# Patient Record
Sex: MALE | Race: WHITE | Employment: UNEMPLOYED | URBAN - METROPOLITAN AREA
[De-identification: names, ages, dates, MRNs, and addresses within clinical notes are randomized per-mention and may not be internally consistent; named-entity substitution may affect disease eponyms.]

---

## 2020-11-21 ENCOUNTER — TELEPHONE (OUTPATIENT)
Dept: GERIATRICS | Facility: CLINIC | Age: 60
End: 2020-11-21

## 2020-11-21 DIAGNOSIS — R52 GENERALIZED PAIN: Primary | ICD-10-CM

## 2020-11-21 RX ORDER — OXYCODONE HYDROCHLORIDE 5 MG/1
5 TABLET ORAL EVERY 4 HOURS PRN
Qty: 6 TABLET | Refills: 0
Start: 2020-11-21 | End: 2020-11-24

## 2020-11-21 NOTE — TELEPHONE ENCOUNTER
Resident is new and has oxycodone 5mg every 6 hours prn and states while in hospital was on 10mg every 3 hours prn.    Asking for a increase in frequency and will see how that does for him.  No script sent, just update his orders in Epic.    Electronically signed by Stephanie Pino RN, CNP

## 2020-11-23 ENCOUNTER — TELEPHONE (OUTPATIENT)
Dept: GERIATRICS | Facility: CLINIC | Age: 60
End: 2020-11-23

## 2020-11-23 VITALS
TEMPERATURE: 97.3 F | RESPIRATION RATE: 22 BRPM | WEIGHT: 220 LBS | SYSTOLIC BLOOD PRESSURE: 131 MMHG | HEIGHT: 69 IN | HEART RATE: 79 BPM | BODY MASS INDEX: 32.58 KG/M2 | OXYGEN SATURATION: 92 % | DIASTOLIC BLOOD PRESSURE: 72 MMHG

## 2020-11-23 RX ORDER — AMLODIPINE BESYLATE 10 MG/1
10 TABLET ORAL DAILY
COMMUNITY
Start: 2020-11-21

## 2020-11-23 RX ORDER — LANOLIN ALCOHOL/MO/W.PET/CERES
3 CREAM (GRAM) TOPICAL AT BEDTIME
COMMUNITY
Start: 2020-11-20

## 2020-11-23 RX ORDER — LABETALOL 300 MG/1
150 TABLET, FILM COATED ORAL EVERY 8 HOURS
COMMUNITY
Start: 2020-11-20

## 2020-11-23 RX ORDER — SENNOSIDES A AND B 8.6 MG/1
17.2 TABLET, FILM COATED ORAL AT BEDTIME
COMMUNITY
Start: 2020-11-20

## 2020-11-23 RX ORDER — OXYCODONE HYDROCHLORIDE 10 MG/1
5 TABLET ORAL EVERY 4 HOURS PRN
COMMUNITY
Start: 2020-11-20 | End: 2020-11-24 | Stop reason: DRUGHIGH

## 2020-11-23 RX ORDER — DIMETHIC/ZINC OX/VITS A,D/ALOE 1 %-10 %
CREAM (GRAM) TOPICAL
COMMUNITY

## 2020-11-23 RX ORDER — GABAPENTIN 300 MG/1
400 CAPSULE ORAL 3 TIMES DAILY
COMMUNITY
Start: 2020-11-20 | End: 2020-12-24 | Stop reason: DRUGHIGH

## 2020-11-23 RX ORDER — NICOTINE POLACRILEX 4 MG
15 LOZENGE BUCCAL
COMMUNITY
Start: 2020-11-20

## 2020-11-23 RX ORDER — TAMSULOSIN HYDROCHLORIDE 0.4 MG/1
0.4 CAPSULE ORAL DAILY
COMMUNITY
Start: 2020-11-21

## 2020-11-23 RX ORDER — POLYETHYLENE GLYCOL 3350 17 G/17G
17 POWDER, FOR SOLUTION ORAL DAILY PRN
COMMUNITY
Start: 2020-11-20

## 2020-11-23 RX ORDER — INSULIN GLARGINE 100 [IU]/ML
20 INJECTION, SOLUTION SUBCUTANEOUS DAILY
COMMUNITY
Start: 2020-11-21

## 2020-11-23 RX ORDER — ACETAMINOPHEN 500 MG
1000 TABLET ORAL 3 TIMES DAILY
COMMUNITY
Start: 2020-11-20

## 2020-11-23 ASSESSMENT — MIFFLIN-ST. JEOR: SCORE: 1803.29

## 2020-11-23 NOTE — PROGRESS NOTES
[facility]  PRIMARY CARE PROVIDER AND CLINIC:  Physician No Ref-Primary, No address on file  Chief Complaint   Patient presents with     Hospital F/U     [facility] Medical Record Number:  [id number]  Place of Service where encounter took place:  [facility]  ([facility]) [[id number]]    Zen Quiles  is a 60 year old  (1960), admitted to the above facility from  [facility]. Hospital stay 9/14/2020 through 11/20/2020..  Admitted to this facility for  rehab, medical management and nursing care.    HPI:    HPI information obtained from: facility chart records, facility staff, patient report, [facility] chart review and Care Everywhere [facility] chart review.   Brief Summary of Hospital Course:   Patient hospitalized during above noted dates following a 35-foot fall [place] - he was transferred to a local medical facility with a GCS of 3, intubated and then transferred to [facility] - injuries at the time of admission to [facility] as noted:    1. Traumatic brain injury with left frontotemporoparietal subdural hematoma and left temporal subarachnoid hemorrhage.    2. C3-C4 anterior epidural hematoma.     3. C6T2 superior endplate fractures.    4. Cervical and thoracic spine ligamentous injury.    5. T10-T11 acute unstable three-column fracture.    6. Multiple bilateral rib fractures with a flail segment on left side.    7. Left pneumothorax.    8. Left scapular fracture.    9. Right acetabular fracture.    10. Right vertical shear pelvic ring injury.    He underwent surgery for pelvic ring stabilization requiring three different staged procedures; 8/24 he underwent left rib plating and a posterior fusion of T8-L2 and T11 - he also underwent a two-stage procedure for an acetabular fracture. He stabilized and was extubated on 9/7 and transferred to [facility] for above noted dates for ongoing acute rehab. During his time at  [facility] he progressed gradually with therapies and was A&Ox3 at the time of discharge to [facility]. Only notable complication was presence of an empyema of his R posterior chest for which he spiked fevers from had a chest tube placed from 10/9-10/20/2020 - drainage grew polymicrobial huan for which he continued on IV Meropenem through 11/5/2020; then transitioned to a PO regimen of Bactrim + Flagyl which completed on 11/20/2020. He does continue to suffer from Dysphagia 2/2 TBI requiring TF via NJ tube - this was removed on 11/6 and he is tolerating an oral diet with honey thickened liquids. He continues with use of TLSO brace whenever OOB or upright >30  - he should have Xrays of thoracic spine on 11/23 and these should be communicated to Dr. Damien Romero at [facility]. He also continues to be TTWB on his RLE - he has a PRAFO he wears on this that is supposed to be on for 3h, then off for 1h. He should follow-up with Dr Lucinda Dawn -  OrthoTrauma team the last week in November as well - . He is diagnosed with SHERYL, but declines use of CPAP Chronic ailments including HTN, DMII and obesity remained stable during his stay.    Updates on Status Since Skilled nursing Admission:   Patient seen today for admission visit to TCU. Patient notes he is doing very well. We discuss the happenings leading to his fall - he is not quite sure what occurred and notes he has hiked that trail approximately 35 times prior to his fall. He is very proud of how far he has come. He does note concern today with pain control - he notes that prior to discharge from [facility] he had Oxycodone 10mg available q3h PRN - he utilized this ~3x per day, usually for therapies and for when he was planning on sitting up in the chair for an extended period of time - upon discharge this was decreased to 5mg q6h PRN - he notes this is limiting his abilities with therapies. He continues with a gentile catheter in place - he states ~2 weeks ago the performed a trial void  in which he was unable to urinate; he would like to trial this again. Therapies are going well other than pain control concerns as above; Xrays from 11/23 were faxed to [facility] - awaiting response; plan to follow-up with Ortho Trauma team on 12/3. Appetite is good. He is sleeping better following changing of mattress. Denies CP, palpitations, fatigue, nausea, vomiting, increased SOB/WHITE, fever, chills, and/or b/b concerns today.    CODE STATUS/ADVANCE DIRECTIVES DISCUSSION:   CPR/Full code   Patient's living condition: lives in a skilled nursing facility  ALLERGIES: Patient has no known allergies.  PAST MEDICAL HISTORY:  has no past medical history on file.  PAST SURGICAL HISTORY:   has no past surgical history on file.  FAMILY HISTORY: family history is not on file.  SOCIAL HISTORY:       Post Discharge Medication Reconciliation Status: discharge medications reconciled, continue medications without change    Current Outpatient Medications   Medication Sig Dispense Refill     acetaminophen (TYLENOL) 500 MG tablet 1,000 mg by Per G Tube route 3 times daily        amLODIPine (NORVASC) 10 MG tablet 10 mg by Per G Tube route daily        Dimethicone-Zinc Oxide-Vit A-D (A & D ZINC OXIDE) CREA Apply to perineum topically as needed for skin breakdown       enoxaparin ANTICOAGULANT (LOVENOX) 40 MG/0.4ML syringe Inject 40 mg Subcutaneous daily       gabapentin (NEURONTIN) 300 MG capsule 300 mg by Per G Tube route 3 times daily        glucagon 1 MG kit Inject 1 mg into the muscle as needed for low blood sugar        glucose 40 % (400 mg/mL) gel Take 15 g by mouth every hour as needed for low blood sugar        insulin glargine (LANTUS VIAL) 100 UNIT/ML vial Inject 24 Units Subcutaneous daily        insulin lispro (HUMALOG VIAL) 100 UNIT/ML vial Inject Subcutaneous 2 times daily Inject as per  sliding scale: if 151 - 200 = 2 units; 201 - 250 = 4  units; 251 - 300 = 6 units; 301 - 350 = 8 units; 351 -  400 = 10 units; 401+ = 12  "units and call medical  provider, subcutaneously two times a day for DM II       labetalol (NORMODYNE) 300 MG tablet 150 mg by Per G Tube route every 8 hours        melatonin 3 MG tablet 3 mg by Per G Tube route At Bedtime        metFORMIN (GLUCOPHAGE) 1000 MG tablet 1,000 mg by Per G Tube route 2 times daily (with meals)        oxyCODONE IR (ROXICODONE) 10 MG tablet 5 mg by Per G Tube route every 4 hours as needed        polyethylene glycol (MIRALAX) 17 GM/Dose powder 17 g by Tube route daily as needed        senna (SENOKOT) 8.6 MG tablet Take 17.2 mg by mouth At Bedtime        tamsulosin (FLOMAX) 0.4 MG capsule Take 0.4 mg by mouth daily        oxyCODONE (ROXICODONE) 5 MG tablet Take 1 tablet (5 mg) by mouth every 4 hours as needed for severe pain 6 tablet 0       ROS:  10 point ROS of systems including Constitutional, Eyes, Respiratory, Cardiovascular, Gastroenterology, Genitourinary, Integumentary, Musculoskeletal, Psychiatric were all negative except for pertinent positives noted in my HPI.    Vitals:  /72   Pulse 79   Temp 97.3  F (36.3  C)   Resp 22   Ht 1.753 m (5' 9\")   Wt 99.8 kg (220 lb)   SpO2 92%   BMI 32.49 kg/m    Exam:  GENERAL APPEARANCE:  Alert, in no distress, appears healthy, oriented, cooperative, middle-aged male resting in bed  EYES:  EOM, conjunctivae, lids, pupils and irises normal  RESP:  respiratory effort and palpation of chest normal, lungs clear to auscultation , no respiratory distress, cough - none  CV:  Palpation and auscultation of heart done , regular rate and rhythm, no murmur, rub, or gallop, no edema, +2 pedal pulses  ABDOMEN:  normal bowel sounds, soft, nontender, no hepatosplenomegaly or other masses  M/S:   Gait and station abnormal - patient transferring with Mercedes at this time; TTWB only to RLE  Digits and nails abnormal - arthritic changes present  Examination of:   right lower extremity  Inspection, ROM, stability and muscle strength diminished 2/2 above " noted trauma and injury  SKIN:  Inspection of skin and subcutaneous tissue baseline, Palpation of skin and subcutaneous tissue baseline  NEURO:   Cranial nerves 2-12 are normal tested and grossly at patient's baseline, strength of RLE 2/5 with/against gravity  PSYCH:  oriented X 3, normal insight, judgement and memory, affect and mood normal    Lab/Diagnostic data:  Recent labs in [facility] reviewed by me today.     ASSESSMENT/PLAN:  (S06.9X9D) Traumatic brain injury with loss of consciousness, subsequent encounter  (primary encounter diagnosis)  (S06.5X9D) Post-traumatic left frontotemporoparietal subdural hematoma with loss of consciousness, subsequent encounter  (S06.6X9D) Left temporal subarachnoid hematoma with loss of consciousness, subsequent encounter  Comment: Patient A&Ox3 - appropriate in conversation; no ongoing tx regimen  Plan: Has made massive gains since admission to [facility] - patient reports back to baseline cognition    (S13.4XXD) Injury to ligament of cervical and thoaracic spine, subsequent encounter  (S12.9XXD) Closed fracture of C6 & C7 cervical vertebra, subsequent encounter  (S22.009D) Closed traumatic displaced fracture of T10-T11 thoracic vertebra with routine healing, subsequent encounter  Comment: S/p posterior fusion of T8-L2 on 8/24/2020  (S32.401D) Closed displaced fracture of right acetabulum with routine healing, unspecified portion of acetabulum, subsequent encounter  (S32.810D) Closed pelvic ring fracture with routine healing, subsequent encounter  Comment: 2/2 above noted fall - follow-up with [facility] and OrthoTrauma as noted above. Patient's pain regimen decreased day of discharge from Oxycodone 10mg q3h PRN (patient was utilizing this ~TID per records) down to 5mg q5h PRN and is currently ineffective in treating his pain when working with therapies/being up in the chair - will increase x1 week then have the need reassessed  Plan: Follow-up as noted; Increase Oxycodone to 5-10mg TID PRN  based on pain scale x1 week; then follow-up with primary NP    (J86.9) Empyema right posterior lung (H)  Comment: Chest tube removed 10/20/2020; Abx completed 11/20/2020 - no acute respiratory concerns at this time  Plan: CBC 11/25    (R13.10) Dysphagia, unspecified type  Comment: 2/2 above noted diagnoses - NG removed 11/6 - patient currently tolerating regular diabetic diet with nectar thickened liquids  Plan: SLP eval/tx - adv per their recommendations    (E11.9,  Z79.4) Type 2 diabetes mellitus without complication, with long-term current use of insulin (H)  Comment: Chronic - managed on regimen of Metformin, Lantus and Lispro sliding scale insulin.    Plan: Plan to simplify regimen prior to discharge home - preferably PO only regimen if able. For now continue medications as ordered and adjust as appropriate.    (F33.9) Episode of recurrent major depressive disorder, unspecified depression episode severity (H)  Comment: Chronic - patient denies sx of depression today; is rather upbeat re:his overcoming these challenges. Currently only utilizing Melatonin for sleep  Plan: Continue Melatonin as ordered; PHQ9 per facility protocol    (I10) Essential hypertension  Comment: Chronic - managed on regimen of Norvasx  Last BPs: 149/79, 129/77, 138/75  Plan: Continue medications as ordered; VS per facility protocol    (G47.33) SHERYL (obstructive sleep apnea)  Comment: Chronic - does not use CPAP chronically  Plan: Noted    (R74.8) Alkaline phosphatase elevation  Comment: Noted with prior lab work  Plan:CMP 11/25    (R33.9) Urinary retention with incomplete bladder emptying  Comment: Patient continues with gentile in place - failed trial void ~2 weeks ago per his report. Was restarted on regimen of Flomax 0.4 on 11/20 - he would like to repeat trial void if able  Plan: continue gentile placement with care per facility protocol; plan for trial void on 11/30/20 to allow full effect of Flomax    (R53.81) Physical  deconditioning  Comment: 2/2 above noted diagnoses and recent hospitalizations/trauma  Plan: PT/OT eval/tx - adv per their recommendations; SW to assist with discharge planning; he would like to be home by [place] with his wife and children - he has 2 steps into home and a 0.5bath on the main floor that can be utilized if needed    Orders written by provider at facility  -CBC/CMP 11/25  -Increase Oxycodone to 5-10mg TID PRN based on pain scale x1 week; then follow-up with primary NP    Total time spent with patient visit at the [facility] nursing [facility] was 45 min including patient visit and review of   past records. Greater than 50% of total time spent with counseling and coordinating care due to medical complexity; discussion re:hositalizations and progression to this point; discussion re:patient goals; discussion re:pain control and planning for trial void as above; and ongoing care planning.     Electronically signed by:  Dr. Soni Powell, DREW, IDALIA  [facility]  [place] Office: [address] [place] [address] [address] [place] [address]   [facility] Cell: [phone]  [facility] Fax: [phone]    [facility] Offce: [address] [place] [address]  [facility] Phone: [phone]  [facility] Voicemail: [phone]    Email: [email or web address]     ~Be the beauty you wish to see in the world~

## 2020-11-23 NOTE — TELEPHONE ENCOUNTER
[facility] TELEPHONE ENCOUNTER    No chief complaint on file.      Zen Quiles is a 60 year old  (1960),Nurse called today to report: pharmacy review, black box warnings for oxycodone, metformin, lovenox.  Patient has been on these meds throughout hospitalization.  Continue.  Due to immobility, potential for skin break down, requesting air mattress  Requested hold parameters for labetalol     ASSESSMENT/PLAN      Continue current metformin, lovenox (through 12/21), Oxycodone.  PCP to follow    OK for air mattress    OK to hold labetalol for SBP <100, notify NP  Electronically signed by:   DREW Haskins CNP

## 2020-11-24 ENCOUNTER — HOSPITAL LABORATORY (OUTPATIENT)
Dept: OTHER | Facility: CLINIC | Age: 60
End: 2020-11-24

## 2020-11-24 ENCOUNTER — NURSING HOME VISIT (OUTPATIENT)
Dept: GERIATRICS | Facility: CLINIC | Age: 60
End: 2020-11-24
Payer: COMMERCIAL

## 2020-11-24 DIAGNOSIS — I10 ESSENTIAL HYPERTENSION: ICD-10-CM

## 2020-11-24 DIAGNOSIS — R74.8 ALKALINE PHOSPHATASE ELEVATION: ICD-10-CM

## 2020-11-24 DIAGNOSIS — S32.401D CLOSED DISPLACED FRACTURE OF RIGHT ACETABULUM WITH ROUTINE HEALING, UNSPECIFIED PORTION OF ACETABULUM, SUBSEQUENT ENCOUNTER: ICD-10-CM

## 2020-11-24 DIAGNOSIS — S32.810D CLOSED PELVIC RING FRACTURE WITH ROUTINE HEALING, SUBSEQUENT ENCOUNTER: ICD-10-CM

## 2020-11-24 DIAGNOSIS — R53.81 PHYSICAL DECONDITIONING: ICD-10-CM

## 2020-11-24 DIAGNOSIS — S06.9X9D TRAUMATIC BRAIN INJURY WITH LOSS OF CONSCIOUSNESS, SUBSEQUENT ENCOUNTER: Primary | ICD-10-CM

## 2020-11-24 DIAGNOSIS — S12.9XXD CLOSED FRACTURE OF CERVICAL VERTEBRA, UNSPECIFIED CERVICAL VERTEBRAL LEVEL, SUBSEQUENT ENCOUNTER: ICD-10-CM

## 2020-11-24 DIAGNOSIS — S06.6X9D SUBARACHNOID HEMATOMA WITH LOSS OF CONSCIOUSNESS, SUBSEQUENT ENCOUNTER: ICD-10-CM

## 2020-11-24 DIAGNOSIS — S13.4XXD INJURY TO LIGAMENT OF CERVICAL SPINE, SUBSEQUENT ENCOUNTER: ICD-10-CM

## 2020-11-24 DIAGNOSIS — Z79.4 TYPE 2 DIABETES MELLITUS WITHOUT COMPLICATION, WITH LONG-TERM CURRENT USE OF INSULIN (H): ICD-10-CM

## 2020-11-24 DIAGNOSIS — S06.5X9D POST-TRAUMATIC SUBDURAL HEMATOMA WITH LOSS OF CONSCIOUSNESS, SUBSEQUENT ENCOUNTER: ICD-10-CM

## 2020-11-24 DIAGNOSIS — S22.009D: ICD-10-CM

## 2020-11-24 DIAGNOSIS — F33.9 EPISODE OF RECURRENT MAJOR DEPRESSIVE DISORDER, UNSPECIFIED DEPRESSION EPISODE SEVERITY (H): ICD-10-CM

## 2020-11-24 DIAGNOSIS — G47.33 OSA (OBSTRUCTIVE SLEEP APNEA): ICD-10-CM

## 2020-11-24 DIAGNOSIS — J86.9 EMPYEMA LUNG (H): ICD-10-CM

## 2020-11-24 DIAGNOSIS — R33.9 URINARY RETENTION WITH INCOMPLETE BLADDER EMPTYING: ICD-10-CM

## 2020-11-24 DIAGNOSIS — E11.9 TYPE 2 DIABETES MELLITUS WITHOUT COMPLICATION, WITH LONG-TERM CURRENT USE OF INSULIN (H): ICD-10-CM

## 2020-11-24 DIAGNOSIS — R13.10 DYSPHAGIA, UNSPECIFIED TYPE: ICD-10-CM

## 2020-11-24 PROBLEM — Z48.89 AFTERCARE FOLLOWING SURGERY FOR INJURY AND TRAUMA: Status: ACTIVE | Noted: 2020-09-14

## 2020-11-24 PROCEDURE — 99310 SBSQ NF CARE HIGH MDM 45: CPT | Performed by: NURSE PRACTITIONER

## 2020-11-24 RX ORDER — OXYCODONE HYDROCHLORIDE 5 MG/1
5-10 TABLET ORAL 3 TIMES DAILY PRN
Qty: 32 TABLET | Refills: 0 | Status: SHIPPED | OUTPATIENT
Start: 2020-11-24 | End: 2020-12-01

## 2020-11-24 NOTE — PROGRESS NOTES
Order from today's visit:  -CBC and CMP 11/25/2020 - Dx. Anemia/HTN  -Increase Oxycodone to 5-10mg TID PRN based on pain scale x1 week; then follow-up with primary NP    Dr. Soni Powell, APRN, DNP  [facility]  [place] Office: [address] [place] [address] [address] [place] [address]   [facility] Cell: [phone]  [facility] Fax: [phone]    [facility] Offce: [address] [place] [address]  [facility] Phone: [phone]  [facility] Voicemail: [phone]    Email: [email or web address]     ~Be the beauty you wish to see in the world~

## 2020-11-24 NOTE — LETTER
11/24/2020        RE: Zen Quiles  [address]  [facility] [address]        [place] [facility]  PRIMARY CARE PROVIDER AND CLINIC:  Physician No Ref-Primary, No address on file  Chief Complaint   Patient presents with     Hospital F/U     [facility] Medical Record Number:  [id number]  Place of Service where encounter took place:  [facility]  ([facility]) [[id number]]    Zen Quiles  is a 60 year old  (1960), admitted to the above facility from  [facility]. Hospital stay 9/14/2020 through 11/20/2020..  Admitted to this facility for  rehab, medical management and nursing care.    HPI:    HPI information obtained from: facility chart records, facility staff, patient report, [facility] chart review and Care Everywhere [facility] chart review.   Brief Summary of Hospital Course:   Patient hospitalized during above noted dates following a 35-foot fall while hiking in [place] on 8/1/2020 - he was transferred to a local medical facility with a GCS of 3, intubated and then transferred to [facility] - injuries at the time of admission to [facility] as noted:    1. Traumatic brain injury with left frontotemporoparietal subdural hematoma and left temporal subarachnoid hemorrhage.    2. C3-C4 anterior epidural hematoma.     3. C6T2 superior endplate fractures.    4. Cervical and thoracic spine ligamentous injury.    5. T10-T11 acute unstable three-column fracture.    6. Multiple bilateral rib fractures with a flail segment on left side.    7. Left pneumothorax.    8. Left scapular fracture.    9. Right acetabular fracture.    10. Right vertical shear pelvic ring injury.    He underwent surgery for pelvic ring stabilization requiring three different staged procedures; 8/24 he underwent left rib plating and a posterior fusion of T8-L2 and T11 - he also underwent a two-stage procedure for an acetabular fracture. He stabilized and was extubated on 9/7 and transferred  to [facility] for above noted dates for ongoing acute rehab. During his time at [facility] he progressed gradually with therapies and was A&Ox3 at the time of discharge to [facility]. Only notable complication was presence of an empyema of his R posterior chest for which he spiked fevers from had a chest tube placed from 10/9-10/20/2020 - drainage grew polymicrobial huan for which he continued on IV Meropenem through 11/5/2020; then transitioned to a PO regimen of Bactrim + Flagyl which completed on 11/20/2020. He does continue to suffer from Dysphagia 2/2 TBI requiring TF via NJ tube - this was removed on 11/6 and he is tolerating an oral diet with honey thickened liquids. He continues with use of TLSO brace whenever OOB or upright >30  - he should have Xrays of thoracic spine on 11/23 and these should be communicated to Dr. Damien Romero at [facility]. He also continues to be TTWB on his RLE - he has a PRAFO he wears on this that is supposed to be on for 3h, then off for 1h. He should follow-up with Dr Lucinda Dawn -  OrthoTrauma team the last week in November as well - . He is diagnosed with SHERYL, but declines use of CPAP Chronic ailments including HTN, DMII and obesity remained stable during his stay.    Updates on Status Since Skilled nursing Admission:   Patient seen today for admission visit to TCU. Patient notes he is doing very well. We discuss the happenings leading to his fall - he is not quite sure what occurred and notes he has hiked that trail approximately 35 times prior to his fall. He is very proud of how far he has come. He does note concern today with pain control - he notes that prior to discharge from [facility] he had Oxycodone 10mg available q3h PRN - he utilized this ~3x per day, usually for therapies and for when he was planning on sitting up in the chair for an extended period of time - upon discharge this was decreased to 5mg q6h PRN - he notes this is limiting his abilities with therapies. He  continues with a gentile catheter in place - he states ~2 weeks ago the performed a trial void in which he was unable to urinate; he would like to trial this again. Therapies are going well other than pain control concerns as above; Xrays from 11/23 were faxed to [facility] - awaiting response; plan to follow-up with Ortho Trauma team on 12/3. Appetite is good. He is sleeping better following changing of mattress. Denies CP, palpitations, fatigue, nausea, vomiting, increased SOB/WHITE, fever, chills, and/or b/b concerns today.    CODE STATUS/ADVANCE DIRECTIVES DISCUSSION:   CPR/Full code   Patient's living condition: lives in a skilled nursing facility  ALLERGIES: Patient has no known allergies.  PAST MEDICAL HISTORY:  has no past medical history on file.  PAST SURGICAL HISTORY:   has no past surgical history on file.  FAMILY HISTORY: family history is not on file.  SOCIAL HISTORY:       Post Discharge Medication Reconciliation Status: discharge medications reconciled, continue medications without change    Current Outpatient Medications   Medication Sig Dispense Refill     acetaminophen (TYLENOL) 500 MG tablet 1,000 mg by Per G Tube route 3 times daily        amLODIPine (NORVASC) 10 MG tablet 10 mg by Per G Tube route daily        Dimethicone-Zinc Oxide-Vit A-D (A & D ZINC OXIDE) CREA Apply to perineum topically as needed for skin breakdown       enoxaparin ANTICOAGULANT (LOVENOX) 40 MG/0.4ML syringe Inject 40 mg Subcutaneous daily       gabapentin (NEURONTIN) 300 MG capsule 300 mg by Per G Tube route 3 times daily        glucagon 1 MG kit Inject 1 mg into the muscle as needed for low blood sugar        glucose 40 % (400 mg/mL) gel Take 15 g by mouth every hour as needed for low blood sugar        insulin glargine (LANTUS VIAL) 100 UNIT/ML vial Inject 24 Units Subcutaneous daily        insulin lispro (HUMALOG VIAL) 100 UNIT/ML vial Inject Subcutaneous 2 times daily Inject as per  sliding scale: if 151 - 200 = 2 units; 201 -  "250 = 4  units; 251 - 300 = 6 units; 301 - 350 = 8 units; 351 -  400 = 10 units; 401+ = 12 units and call medical  provider, subcutaneously two times a day for DM II       labetalol (NORMODYNE) 300 MG tablet 150 mg by Per G Tube route every 8 hours        melatonin 3 MG tablet 3 mg by Per G Tube route At Bedtime        metFORMIN (GLUCOPHAGE) 1000 MG tablet 1,000 mg by Per G Tube route 2 times daily (with meals)        oxyCODONE IR (ROXICODONE) 10 MG tablet 5 mg by Per G Tube route every 4 hours as needed        polyethylene glycol (MIRALAX) 17 GM/Dose powder 17 g by Tube route daily as needed        senna (SENOKOT) 8.6 MG tablet Take 17.2 mg by mouth At Bedtime        tamsulosin (FLOMAX) 0.4 MG capsule Take 0.4 mg by mouth daily        oxyCODONE (ROXICODONE) 5 MG tablet Take 1 tablet (5 mg) by mouth every 4 hours as needed for severe pain 6 tablet 0       ROS:  10 point ROS of systems including Constitutional, Eyes, Respiratory, Cardiovascular, Gastroenterology, Genitourinary, Integumentary, Musculoskeletal, Psychiatric were all negative except for pertinent positives noted in my HPI.    Vitals:  /72   Pulse 79   Temp 97.3  F (36.3  C)   Resp 22   Ht 1.753 m (5' 9\")   Wt 99.8 kg (220 lb)   SpO2 92%   BMI 32.49 kg/m    Exam:  GENERAL APPEARANCE:  Alert, in no distress, appears healthy, oriented, cooperative, middle-aged male resting in bed  EYES:  EOM, conjunctivae, lids, pupils and irises normal  RESP:  respiratory effort and palpation of chest normal, lungs clear to auscultation , no respiratory distress, cough - none  CV:  Palpation and auscultation of heart done , regular rate and rhythm, no murmur, rub, or gallop, no edema, +2 pedal pulses  ABDOMEN:  normal bowel sounds, soft, nontender, no hepatosplenomegaly or other masses  M/S:   Gait and station abnormal - patient transferring with Mercedes at this time; TTWB only to RLE  Digits and nails abnormal - arthritic changes present  Examination of:   " right lower extremity  Inspection, ROM, stability and muscle strength diminished 2/2 above noted trauma and injury  SKIN:  Inspection of skin and subcutaneous tissue baseline, Palpation of skin and subcutaneous tissue baseline  NEURO:   Cranial nerves 2-12 are normal tested and grossly at patient's baseline, strength of RLE 2/5 with/against gravity  PSYCH:  oriented X 3, normal insight, judgement and memory, affect and mood normal    Lab/Diagnostic data:  Recent labs in [facility] reviewed by me today.     ASSESSMENT/PLAN:  (S06.9X9D) Traumatic brain injury with loss of consciousness, subsequent encounter  (primary encounter diagnosis)  (S06.5X9D) Post-traumatic left frontotemporoparietal subdural hematoma with loss of consciousness, subsequent encounter  (S06.6X9D) Left temporal subarachnoid hematoma with loss of consciousness, subsequent encounter  Comment: Patient A&Ox3 - appropriate in conversation; no ongoing tx regimen  Plan: Has made massive gains since admission to [facility] - patient reports back to baseline cognition    (S13.4XXD) Injury to ligament of cervical and thoaracic spine, subsequent encounter  (S12.9XXD) Closed fracture of C6 & C7 cervical vertebra, subsequent encounter  (S22.009D) Closed traumatic displaced fracture of T10-T11 thoracic vertebra with routine healing, subsequent encounter  Comment: S/p posterior fusion of T8-L2 on 8/24/2020  (S32.401D) Closed displaced fracture of right acetabulum with routine healing, unspecified portion of acetabulum, subsequent encounter  (S32.810D) Closed pelvic ring fracture with routine healing, subsequent encounter  Comment: 2/2 above noted fall - follow-up with [facility] and OrthoTrauma as noted above. Patient's pain regimen decreased day of discharge from Oxycodone 10mg q3h PRN (patient was utilizing this ~TID per records) down to 5mg q5h PRN and is currently ineffective in treating his pain when working with therapies/being up in the chair - will increase x1 week then  have the need reassessed  Plan: Follow-up as noted; Increase Oxycodone to 5-10mg TID PRN based on pain scale x1 week; then follow-up with primary NP    (J86.Aleta) Empyema right posterior lung (H)  Comment: Chest tube removed 10/20/2020; Abx completed 11/20/2020 - no acute respiratory concerns at this time  Plan: CBC 11/25    (R13.10) Dysphagia, unspecified type  Comment: 2/2 above noted diagnoses - NG removed 11/6 - patient currently tolerating regular diabetic diet with nectar thickened liquids  Plan: SLP eval/tx - adv per their recommendations    (E11.9,  Z79.4) Type 2 diabetes mellitus without complication, with long-term current use of insulin (H)  Comment: Chronic - managed on regimen of Metformin, Lantus and Lispro sliding scale insulin.    Plan: Plan to simplify regimen prior to discharge home - preferably PO only regimen if able. For now continue medications as ordered and adjust as appropriate.    (F33.9) Episode of recurrent major depressive disorder, unspecified depression episode severity (H)  Comment: Chronic - patient denies sx of depression today; is rather upbeat re:his overcoming these challenges. Currently only utilizing Melatonin for sleep  Plan: Continue Melatonin as ordered; PHQ9 per facility protocol    (I10) Essential hypertension  Comment: Chronic - managed on regimen of Norvasx  Last BPs: 149/79, 129/77, 138/75  Plan: Continue medications as ordered; VS per facility protocol    (G47.33) SHERYL (obstructive sleep apnea)  Comment: Chronic - does not use CPAP chronically  Plan: Noted    (R74.8) Alkaline phosphatase elevation  Comment: Noted with prior lab work  Plan:CMP 11/25    (R33.9) Urinary retention with incomplete bladder emptying  Comment: Patient continues with gentile in place - failed trial void ~2 weeks ago per his report. Was restarted on regimen of Flomax 0.4 on 11/20 - he would like to repeat trial void if able  Plan: continue gentile placement with care per facility protocol; plan for  trial void on 11/30/20 to allow full effect of Flomax    (R53.81) Physical deconditioning  Comment: 2/2 above noted diagnoses and recent hospitalizations/trauma  Plan: PT/OT eval/tx - adv per their recommendations; SW to assist with discharge planning; he would like to be home by Jamsh[place] with his wife and children - he has 2 steps into home and a 0.5bath on the main floor that can be utilized if needed    Orders written by provider at facility  -CBC/CMP 11/25  -Increase Oxycodone to 5-10mg TID PRN based on pain scale x1 week; then follow-up with primary NP    Total time spent with patient visit at the [facility] was 45 min including patient visit and review of   past records. Greater than 50% of total time spent with counseling and coordinating care due to medical complexity; discussion re:hositalizations and progression to this point; discussion re:patient goals; discussion re:pain control and planning for trial void as above; and ongoing care planning.     Electronically signed by:  DREW Wyatt, IDALIA  [facility] for [facility] Office: [address] [place] [address] Cell: [phone]  [place] Fax: [phone]    [employer] Offce: [email or web address] [place] [address]  [employer] Phone: [phone]  [employer] Voicemail: [phone]    Email: [email or web address]     ~Be the beauty you wish to see in the world~                    Order from today's visit:  -CBC and CMP 11/25/2020 - Dx. Anemia/HTN  -Increase Oxycodone to 5-10mg TID PRN based on pain scale x1 week; then follow-up with primary NP    DREW Wyatt, IDALIA  [facility] for [facility] Office: [address] [place] [address]   [place] Cell: [phone]  [place] Fax: [phone]    [employer] Offce: [address] [place] [address]  [employer] Phone: [phone]  [employer]  Voicemail: [phone]    Email: [email or web address]     ~Be the beauty you wish to see in the world~              Sincerely,        DREW Posada CNP

## 2020-11-25 ENCOUNTER — HOSPITAL LABORATORY (OUTPATIENT)
Dept: OTHER | Facility: CLINIC | Age: 60
End: 2020-11-25

## 2020-11-25 ENCOUNTER — NURSING HOME VISIT (OUTPATIENT)
Dept: GERIATRICS | Facility: CLINIC | Age: 60
End: 2020-11-25
Payer: COMMERCIAL

## 2020-11-25 VITALS
RESPIRATION RATE: 18 BRPM | OXYGEN SATURATION: 95 % | DIASTOLIC BLOOD PRESSURE: 69 MMHG | BODY MASS INDEX: 28.17 KG/M2 | WEIGHT: 190.2 LBS | HEIGHT: 69 IN | HEART RATE: 87 BPM | TEMPERATURE: 98.6 F | SYSTOLIC BLOOD PRESSURE: 135 MMHG

## 2020-11-25 DIAGNOSIS — R33.9 URINE RETENTION: ICD-10-CM

## 2020-11-25 DIAGNOSIS — S32.401D CLOSED NONDISPLACED FRACTURE OF RIGHT ACETABULUM WITH ROUTINE HEALING, UNSPECIFIED PORTION OF ACETABULUM, SUBSEQUENT ENCOUNTER: Primary | ICD-10-CM

## 2020-11-25 DIAGNOSIS — Z97.8 FOLEY CATHETER IN PLACE ON ADMISSION: ICD-10-CM

## 2020-11-25 DIAGNOSIS — E11.9 TYPE 2 DIABETES MELLITUS WITHOUT COMPLICATION, WITH LONG-TERM CURRENT USE OF INSULIN (H): ICD-10-CM

## 2020-11-25 DIAGNOSIS — S32.810D MULTIPLE CLOSED FRACTURES OF PELVIS WITH STABLE DISRUPTION OF PELVIC RING WITH ROUTINE HEALING, SUBSEQUENT ENCOUNTER: ICD-10-CM

## 2020-11-25 DIAGNOSIS — I10 ESSENTIAL HYPERTENSION: ICD-10-CM

## 2020-11-25 DIAGNOSIS — Z98.1 HISTORY OF THORACIC SPINAL FUSION: ICD-10-CM

## 2020-11-25 DIAGNOSIS — Z79.4 TYPE 2 DIABETES MELLITUS WITHOUT COMPLICATION, WITH LONG-TERM CURRENT USE OF INSULIN (H): ICD-10-CM

## 2020-11-25 DIAGNOSIS — R53.81 PHYSICAL DECONDITIONING: ICD-10-CM

## 2020-11-25 DIAGNOSIS — R13.10 DYSPHAGIA, UNSPECIFIED TYPE: ICD-10-CM

## 2020-11-25 DIAGNOSIS — K59.01 SLOW TRANSIT CONSTIPATION: ICD-10-CM

## 2020-11-25 DIAGNOSIS — S06.9X9S TRAUMATIC BRAIN INJURY WITH LOSS OF CONSCIOUSNESS, SEQUELA (H): ICD-10-CM

## 2020-11-25 LAB
ALBUMIN SERPL-MCNC: 2.9 G/DL (ref 3.4–5)
ALP SERPL-CCNC: 92 U/L (ref 40–150)
ALT SERPL W P-5'-P-CCNC: 30 U/L (ref 0–70)
ANION GAP SERPL CALCULATED.3IONS-SCNC: 8 MMOL/L (ref 3–14)
AST SERPL W P-5'-P-CCNC: 17 U/L (ref 0–45)
BILIRUB SERPL-MCNC: 0.4 MG/DL (ref 0.2–1.3)
BUN SERPL-MCNC: 15 MG/DL (ref 7–30)
CALCIUM SERPL-MCNC: 8.9 MG/DL (ref 8.5–10.1)
CHLORIDE SERPL-SCNC: 103 MMOL/L (ref 94–109)
CO2 SERPL-SCNC: 25 MMOL/L (ref 20–32)
CREAT SERPL-MCNC: 0.56 MG/DL (ref 0.66–1.25)
ERYTHROCYTE [DISTWIDTH] IN BLOOD BY AUTOMATED COUNT: 18.2 % (ref 10–15)
GFR SERPL CREATININE-BSD FRML MDRD: >90 ML/MIN/{1.73_M2}
GLUCOSE SERPL-MCNC: 124 MG/DL (ref 70–99)
HCT VFR BLD AUTO: 39.1 % (ref 40–53)
HGB BLD-MCNC: 12.5 G/DL (ref 13.3–17.7)
MCH RBC QN AUTO: 26.9 PG (ref 26.5–33)
MCHC RBC AUTO-ENTMCNC: 32 G/DL (ref 31.5–36.5)
MCV RBC AUTO: 84 FL (ref 78–100)
PLATELET # BLD AUTO: 368 10E9/L (ref 150–450)
POTASSIUM SERPL-SCNC: 4.1 MMOL/L (ref 3.4–5.3)
PROT SERPL-MCNC: 6.9 G/DL (ref 6.8–8.8)
RBC # BLD AUTO: 4.64 10E12/L (ref 4.4–5.9)
SARS-COV-2 RNA SPEC QL NAA+PROBE: NOT DETECTED
SODIUM SERPL-SCNC: 136 MMOL/L (ref 133–144)
SPECIMEN SOURCE: NORMAL
WBC # BLD AUTO: 10.7 10E9/L (ref 4–11)

## 2020-11-25 PROCEDURE — 99305 1ST NF CARE MODERATE MDM 35: CPT | Performed by: INTERNAL MEDICINE

## 2020-11-25 ASSESSMENT — MIFFLIN-ST. JEOR: SCORE: 1668.12

## 2020-11-25 NOTE — LETTER
11/25/2020        RE: Zen Quiles  [address]  [facility] [address]        [place] [facility]  INITIAL VISIT NOTE  November 25, 2020    PRIMARY CARE PROVIDER AND CLINIC:  [facility] [id number] [place] [id number]    Chief Complaint   Patient presents with     Hospital F/U       HPI:    Zen Quiles is a 60 year old  (1960) male who was seen at [facility] on November 25, 2020 for an initial visit. Medical history is notable for DM II and ADHD. Recent medical course is complex. He was hiking at [facility] when he fell 35 feet on 8/21/20. Had multiple trauma (details in A/P) and was flown to [facility] where he was hospitalized from 8/21/20 to 9/14/20. There he had surgery for an acetabular fracture, unstable pelvic ring, multilevel spinal fusion. He was discharged to [facility] from 9/14/20 to 11/20/20 where he required a chest tube for a empyema. Cultures were polymicrobial and he completed antibiotics prior to discharge. He was admitted to this facility for medical management and rehab.     Today, Mr. Quiles is seen in his room before breakfast. Has some buttock pain, but otherwise is doing OK. Is making good progress with therapies and and goal is to get home. No chest pain or dyspnea. No abdominal pain. No concerns today per discussion with nursing.   He is working with therapies and is an extensive assist of one with transfers in and out of bed, bed mobility. dressing upper and lower extremities, brief changes and colby cares.    CODE STATUS:   CPR/Full code     ALLERGIES:   No Known Allergies    PAST MEDICAL HISTORY:   DM II and ADHD    PAST SURGICAL HISTORY:   No past surgical history on file.    FAMILY HISTORY:   Reviewed and non-contributory    SOCIAL HISTORY:   Lives with spouse    MEDICATIONS:  Post Discharge Medication Reconciliation Status: discharge medications reconciled and changed, per note/orders.   Current Outpatient  "Medications   Medication Sig Dispense Refill     acetaminophen (TYLENOL) 500 MG tablet Take 1,000 mg by mouth 3 times daily        amLODIPine (NORVASC) 10 MG tablet Take 10 mg by mouth daily        Dimethicone-Zinc Oxide-Vit A-D (A & D ZINC OXIDE) CREA Apply to perineum topically as needed for skin breakdown       enoxaparin ANTICOAGULANT (LOVENOX) 40 MG/0.4ML syringe Inject 40 mg Subcutaneous daily       gabapentin (NEURONTIN) 300 MG capsule Take 300 mg by mouth 3 times daily        glucagon 1 MG kit Inject 1 mg into the muscle as needed for low blood sugar        glucose 40 % (400 mg/mL) gel Take 15 g by mouth every hour as needed for low blood sugar        insulin glargine (LANTUS VIAL) 100 UNIT/ML vial Inject 24 Units Subcutaneous daily        insulin lispro (HUMALOG VIAL) 100 UNIT/ML vial Inject Subcutaneous 2 times daily Inject as per  sliding scale: if 151 - 200 = 2 units; 201 - 250 = 4  units; 251 - 300 = 6 units; 301 - 350 = 8 units; 351 -  400 = 10 units; 401+ = 12 units and call medical  provider, subcutaneously two times a day for DM II       labetalol (NORMODYNE) 300 MG tablet Take 150 mg by mouth every 8 hours        melatonin 3 MG tablet Take 3 mg by mouth At Bedtime        metFORMIN (GLUCOPHAGE) 1000 MG tablet Take 1,000 mg by mouth 2 times daily (with meals)        oxyCODONE (ROXICODONE) 5 MG tablet Take 1-2 tablets (5-10 mg) by mouth 3 times daily as needed for severe pain (based on pain scale) 32 tablet 0     polyethylene glycol (MIRALAX) 17 GM/Dose powder Take 17 g by mouth daily as needed        senna (SENOKOT) 8.6 MG tablet Take 17.2 mg by mouth At Bedtime        tamsulosin (FLOMAX) 0.4 MG capsule Take 0.4 mg by mouth daily          ROS:  10 point ROS neg other than the symptoms noted above in the HPI.    PHYSICAL EXAM:  /69   Pulse 87   Temp 98.6  F (37  C)   Resp 18   Ht 1.753 m (5' 9\")   Wt 86.3 kg (190 lb 3.2 oz)   SpO2 95%   BMI 28.09 kg/m     Gen: laying in bed, alert, " cooperative and in no acute distress  HEENT: normocephalic; oropharynx clear  Card: RRR, S1, S2, no murmurs  Resp: lungs clear to auscultation bilaterally, no crackles or wheezes  GI: abdomen soft, not-tender  MSK: normal muscle tone, no LE edema; AFO on RLE  Neuro: CX II-XII grossly in tact; ROM in all four extremities grossly in tact  Psych: alert and oriented x3; normal affect    LABORATORY/IMAGING DATA:  Reviewed as per Epic    ASSESSMENT/PLAN:      Right Acetabular Fracture s/p ORIF  Acetabular repair was staged procedure with OR x2  -- TTWB to RLE  -- APAP 1000 mg TID, gabapentin 300 mg TID and oxycodone 5-10 mg q3h PRN  -- DVT ppx with enoxaparin 40 mg subQ daily  -- PT/OT  -- follow up with ortho as scheduled     Right Vertical Shear Pelvic Ring Injury s/p Stabilization  Pelvis stabilization was a staged procedure with OR x3 followed by pelvic hardware removal (10/27/20)  -- TTWB to RLE  -- APAP 1000 mg TID, gabapentin 300 mg TID and oxycodone 5-10 mg q3h PRN   -- DVT ppx with enoxaparin 40 mg subQ daily  -- PT/OT  -- follow up with ortho as scheduled     Encephalopathy Secondary to TBI  Left Frontotemporoparietal SDH  Left Temporal SAH  Mental status appears to have cleared.   -- OT following for cog assessment     C3-C4 Epidural Hematoma  C6-T2 Superior Endplate Fractures  T10-11 Unstable Three Column Fracture  S/p T8-L2 Posterior Fusion (8/24/20)  Secondary to a fall.   -- TLSO when out of bed or head of bed >30 deg  -- analgesia as above   -- follow up with neurosurgery as scheduled    Multiple Bilateral Rib Fractures  Left PTX  Lungs clear today  -- encourage good pulmonary toilet  -- analgesia as above     Right Empyema s/p Chest Tube  Culture was polymicrobial (Klebsiella, MSSA and Citrobacter). Was on meropenem until 11/5 and then was on Bactrim and metronidazole until 11/20. Lungs clear today. Afebrile  -- follow clinically     Acute Blood Loss Anemia  Secondary to above. No evidence of ongoing  bleeding. Hgb 12.5 today.   -- follow clinically     DM, Type II  Hgb A1c not available. Sugars 80s-130s in the morning and 120s-140s during the day. PTA on basaglar 150 units whs   -- decrease glargine from 24 units to 20 units daily  -- continue lispro sliding scale insulin TID AC  -- metformin 1000 mg BID  -- follow sugars and further adjust insulin as needed    Dysphagia  In setting of above  -- regular diet with NTL  -- SLP following - advance diet as per them    HTN  SBPs 130s  -- amlodipine 10 mg daily and labetalol 300 mg daily   -- follow BPs and adjust medications as needed    ADHD  PTA on Adderall 30 mg BID  -- follow clinically     Urinary Retention  -- routine Nevarez cares  -- tamsulosin 0.4 mg daily   -- TOV once more mobile     Slow Transit Constipation  -- Miralax 17g daily PRN and Senna-S 17.2 mg at bedtime   -- adjust bowel regimen as needed    Physical Deconditioning  In setting of hospitalization and underlying medical conditions  -- ongoing PT/OT      Electronically signed by:  Alina Renee MD                  Sincerely,        Alina Renee MD

## 2020-11-25 NOTE — PROGRESS NOTES
[facility]  INITIAL VISIT NOTE  November 25, 2020    PRIMARY CARE PROVIDER AND CLINIC:  [facility] [address] / [place] [address]    Chief Complaint   Patient presents with     Hospital F/U       HPI:    Zen Quiles is a 60 year old  (1960) male who was seen at [facility] on November 25, 2020 for an initial visit. Medical history is notable for DM II and ADHD. Recent medical course is complex. He was hiking at [facility] when he fell 35 feet on 8/21/20. Had multiple trauma (details in A/P) and was flown to [facility] where he was hospitalized from 8/21/20 to 9/14/20. There he had surgery for an acetabular fracture, unstable pelvic ring, multilevel spinal fusion. He was discharged to [facility] from 9/14/20 to 11/20/20 where he required a chest tube for a empyema. Cultures were polymicrobial and he completed antibiotics prior to discharge. He was admitted to this facility for medical management and rehab.     Today, Mr. Quiles is seen in his room before breakfast. Has some buttock pain, but otherwise is doing OK. Is making good progress with therapies and and goal is to get home. No chest pain or dyspnea. No abdominal pain. No concerns today per discussion with nursing.   He is working with therapies and is an extensive assist of one with transfers in and out of bed, bed mobility. dressing upper and lower extremities, brief changes and colby cares.    CODE STATUS:   CPR/Full code     ALLERGIES:   No Known Allergies    PAST MEDICAL HISTORY:   DM II and ADHD    PAST SURGICAL HISTORY:   No past surgical history on file.    FAMILY HISTORY:   Reviewed and non-contributory    SOCIAL HISTORY:   Lives with spouse    MEDICATIONS:  Post Discharge Medication Reconciliation Status: discharge medications reconciled and changed, per note/orders.   Current Outpatient Medications   Medication Sig Dispense Refill     acetaminophen (TYLENOL) 500 MG tablet  "Take 1,000 mg by mouth 3 times daily        amLODIPine (NORVASC) 10 MG tablet Take 10 mg by mouth daily        Dimethicone-Zinc Oxide-Vit A-D (A & D ZINC OXIDE) CREA Apply to perineum topically as needed for skin breakdown       enoxaparin ANTICOAGULANT (LOVENOX) 40 MG/0.4ML syringe Inject 40 mg Subcutaneous daily       gabapentin (NEURONTIN) 300 MG capsule Take 300 mg by mouth 3 times daily        glucagon 1 MG kit Inject 1 mg into the muscle as needed for low blood sugar        glucose 40 % (400 mg/mL) gel Take 15 g by mouth every hour as needed for low blood sugar        insulin glargine (LANTUS VIAL) 100 UNIT/ML vial Inject 24 Units Subcutaneous daily        insulin lispro (HUMALOG VIAL) 100 UNIT/ML vial Inject Subcutaneous 2 times daily Inject as per  sliding scale: if 151 - 200 = 2 units; 201 - 250 = 4  units; 251 - 300 = 6 units; 301 - 350 = 8 units; 351 -  400 = 10 units; 401+ = 12 units and call medical  provider, subcutaneously two times a day for DM II       labetalol (NORMODYNE) 300 MG tablet Take 150 mg by mouth every 8 hours        melatonin 3 MG tablet Take 3 mg by mouth At Bedtime        metFORMIN (GLUCOPHAGE) 1000 MG tablet Take 1,000 mg by mouth 2 times daily (with meals)        oxyCODONE (ROXICODONE) 5 MG tablet Take 1-2 tablets (5-10 mg) by mouth 3 times daily as needed for severe pain (based on pain scale) 32 tablet 0     polyethylene glycol (MIRALAX) 17 GM/Dose powder Take 17 g by mouth daily as needed        senna (SENOKOT) 8.6 MG tablet Take 17.2 mg by mouth At Bedtime        tamsulosin (FLOMAX) 0.4 MG capsule Take 0.4 mg by mouth daily          ROS:  10 point ROS neg other than the symptoms noted above in the HPI.    PHYSICAL EXAM:  /69   Pulse 87   Temp 98.6  F (37  C)   Resp 18   Ht 1.753 m (5' 9\")   Wt 86.3 kg (190 lb 3.2 oz)   SpO2 95%   BMI 28.09 kg/m     Gen: laying in bed, alert, cooperative and in no acute distress  HEENT: normocephalic; oropharynx clear  Card: RRR, S1, " S2, no murmurs  Resp: lungs clear to auscultation bilaterally, no crackles or wheezes  GI: abdomen soft, not-tender  MSK: normal muscle tone, no LE edema; AFO on RLE  Neuro: CX II-XII grossly in tact; ROM in all four extremities grossly in tact  Psych: alert and oriented x3; normal affect    LABORATORY/IMAGING DATA:  Reviewed as per Epic    ASSESSMENT/PLAN:      Right Acetabular Fracture s/p ORIF  Acetabular repair was staged procedure with OR x2  -- TTWB to RLE  -- APAP 1000 mg TID, gabapentin 300 mg TID and oxycodone 5-10 mg q3h PRN  -- DVT ppx with enoxaparin 40 mg subQ daily  -- PT/OT  -- follow up with ortho as scheduled     Right Vertical Shear Pelvic Ring Injury s/p Stabilization  Pelvis stabilization was a staged procedure with OR x3 followed by pelvic hardware removal (10/27/20)  -- TTWB to RLE  -- APAP 1000 mg TID, gabapentin 300 mg TID and oxycodone 5-10 mg q3h PRN   -- DVT ppx with enoxaparin 40 mg subQ daily  -- PT/OT  -- follow up with ortho as scheduled     Encephalopathy Secondary to TBI  Left Frontotemporoparietal SDH  Left Temporal SAH  Mental status appears to have cleared.   -- OT following for cog assessment     C3-C4 Epidural Hematoma  C6-T2 Superior Endplate Fractures  T10-11 Unstable Three Column Fracture  S/p T8-L2 Posterior Fusion (8/24/20)  Secondary to a fall.   -- TLSO when out of bed or head of bed >30 deg  -- analgesia as above   -- follow up with neurosurgery as scheduled    Multiple Bilateral Rib Fractures  Left PTX  Lungs clear today  -- encourage good pulmonary toilet  -- analgesia as above     Right Empyema s/p Chest Tube  Culture was polymicrobial (Klebsiella, MSSA and Citrobacter). Was on meropenem until 11/5 and then was on Bactrim and metronidazole until 11/20. Lungs clear today. Afebrile  -- follow clinically     Acute Blood Loss Anemia  Secondary to above. No evidence of ongoing bleeding. Hgb 12.5 today.   -- follow clinically     DM, Type II  Hgb A1c not available. Sugars  80s-130s in the morning and 120s-140s during the day. PTA on basaglar 150 units whs   -- decrease glargine from 24 units to 20 units daily  -- continue lispro sliding scale insulin TID AC  -- metformin 1000 mg BID  -- follow sugars and further adjust insulin as needed    Dysphagia  In setting of above  -- regular diet with NTL  -- SLP following - advance diet as per them    HTN  SBPs 130s  -- amlodipine 10 mg daily and labetalol 300 mg daily   -- follow BPs and adjust medications as needed    ADHD  PTA on Adderall 30 mg BID  -- follow clinically     Urinary Retention  -- routine Nevarez cares  -- tamsulosin 0.4 mg daily   -- TOV once more mobile     Slow Transit Constipation  -- Miralax 17g daily PRN and Senna-S 17.2 mg at bedtime   -- adjust bowel regimen as needed    Physical Deconditioning  In setting of hospitalization and underlying medical conditions  -- ongoing PT/OT      Electronically signed by:  Alina Renee MD

## 2020-11-26 ENCOUNTER — TELEPHONE (OUTPATIENT)
Dept: GERIATRICS | Facility: CLINIC | Age: 60
End: 2020-11-26

## 2020-12-01 ENCOUNTER — HOSPITAL LABORATORY (OUTPATIENT)
Dept: OTHER | Facility: CLINIC | Age: 60
End: 2020-12-01

## 2020-12-01 DIAGNOSIS — S32.401D CLOSED DISPLACED FRACTURE OF RIGHT ACETABULUM WITH ROUTINE HEALING, UNSPECIFIED PORTION OF ACETABULUM, SUBSEQUENT ENCOUNTER: ICD-10-CM

## 2020-12-01 DIAGNOSIS — S12.9XXD CLOSED FRACTURE OF CERVICAL VERTEBRA, UNSPECIFIED CERVICAL VERTEBRAL LEVEL, SUBSEQUENT ENCOUNTER: ICD-10-CM

## 2020-12-01 DIAGNOSIS — S32.810D CLOSED PELVIC RING FRACTURE WITH ROUTINE HEALING, SUBSEQUENT ENCOUNTER: ICD-10-CM

## 2020-12-01 DIAGNOSIS — S13.4XXD INJURY TO LIGAMENT OF CERVICAL SPINE, SUBSEQUENT ENCOUNTER: ICD-10-CM

## 2020-12-01 DIAGNOSIS — S22.009D: ICD-10-CM

## 2020-12-01 RX ORDER — OXYCODONE HYDROCHLORIDE 5 MG/1
5-10 TABLET ORAL 3 TIMES DAILY PRN
Qty: 30 TABLET | Refills: 0 | Status: SHIPPED | OUTPATIENT
Start: 2020-12-01 | End: 2020-12-11

## 2020-12-02 VITALS
RESPIRATION RATE: 16 BRPM | HEART RATE: 77 BPM | SYSTOLIC BLOOD PRESSURE: 121 MMHG | WEIGHT: 191.2 LBS | TEMPERATURE: 97.8 F | BODY MASS INDEX: 28.32 KG/M2 | OXYGEN SATURATION: 94 % | DIASTOLIC BLOOD PRESSURE: 63 MMHG | HEIGHT: 69 IN

## 2020-12-02 ASSESSMENT — MIFFLIN-ST. JEOR: SCORE: 1672.66

## 2020-12-03 ENCOUNTER — NURSING HOME VISIT (OUTPATIENT)
Dept: GERIATRICS | Facility: CLINIC | Age: 60
End: 2020-12-03
Payer: COMMERCIAL

## 2020-12-03 DIAGNOSIS — I10 ESSENTIAL HYPERTENSION: ICD-10-CM

## 2020-12-03 DIAGNOSIS — R53.81 PHYSICAL DECONDITIONING: ICD-10-CM

## 2020-12-03 DIAGNOSIS — R13.10 DYSPHAGIA, UNSPECIFIED TYPE: ICD-10-CM

## 2020-12-03 DIAGNOSIS — S32.810D MULTIPLE CLOSED FRACTURES OF PELVIS WITH STABLE DISRUPTION OF PELVIC RING WITH ROUTINE HEALING, SUBSEQUENT ENCOUNTER: ICD-10-CM

## 2020-12-03 DIAGNOSIS — R33.9 URINE RETENTION: ICD-10-CM

## 2020-12-03 DIAGNOSIS — E11.9 TYPE 2 DIABETES MELLITUS WITHOUT COMPLICATION, WITH LONG-TERM CURRENT USE OF INSULIN (H): ICD-10-CM

## 2020-12-03 DIAGNOSIS — Z79.4 TYPE 2 DIABETES MELLITUS WITHOUT COMPLICATION, WITH LONG-TERM CURRENT USE OF INSULIN (H): ICD-10-CM

## 2020-12-03 DIAGNOSIS — S06.9X9S TRAUMATIC BRAIN INJURY WITH LOSS OF CONSCIOUSNESS, SEQUELA (H): ICD-10-CM

## 2020-12-03 DIAGNOSIS — Z98.1 HISTORY OF THORACIC SPINAL FUSION: ICD-10-CM

## 2020-12-03 DIAGNOSIS — S32.401D CLOSED NONDISPLACED FRACTURE OF RIGHT ACETABULUM WITH ROUTINE HEALING, UNSPECIFIED PORTION OF ACETABULUM, SUBSEQUENT ENCOUNTER: Primary | ICD-10-CM

## 2020-12-03 DIAGNOSIS — Z97.8 FOLEY CATHETER IN PLACE ON ADMISSION: ICD-10-CM

## 2020-12-03 LAB
SARS-COV-2 RNA SPEC QL NAA+PROBE: NOT DETECTED
SPECIMEN SOURCE: NORMAL

## 2020-12-03 PROCEDURE — 99309 SBSQ NF CARE MODERATE MDM 30: CPT | Performed by: NURSE PRACTITIONER

## 2020-12-03 RX ORDER — LIDOCAINE 4 G/G
2 PATCH TOPICAL EVERY 24 HOURS
Qty: 2 PATCH | Refills: 0
Start: 2020-12-03

## 2020-12-03 NOTE — PROGRESS NOTES
[facility]  [facility] Medical Record Number:  [id number]  Place of Service where encounter took place:  [facility]  ([facility]) [[id number]]  Chief Complaint   Patient presents with     RECHECK       HPI:    Zen Quiles  is a 60 year old (1960), who is being seen today for an episodic care visit.  HPI information obtained from: facility chart records, facility staff, patient report and [facility] chart review. Today's concern is:  {S DX:906143}    Past Medical and Surgical History reviewed in Epic today.    MEDICATIONS:    Current Outpatient Medications   Medication Sig Dispense Refill     acetaminophen (TYLENOL) 500 MG tablet Take 1,000 mg by mouth 3 times daily        amLODIPine (NORVASC) 10 MG tablet Take 10 mg by mouth daily        Dimethicone-Zinc Oxide-Vit A-D (A & D ZINC OXIDE) CREA Apply to perineum topically as needed for skin breakdown       enoxaparin ANTICOAGULANT (LOVENOX) 40 MG/0.4ML syringe Inject 40 mg Subcutaneous daily       gabapentin (NEURONTIN) 300 MG capsule Take 300 mg by mouth 3 times daily        glucagon 1 MG kit Inject 1 mg into the muscle as needed for low blood sugar        glucose 40 % (400 mg/mL) gel Take 15 g by mouth every hour as needed for low blood sugar        insulin glargine (LANTUS VIAL) 100 UNIT/ML vial Inject 20 Units Subcutaneous daily       insulin lispro (HUMALOG VIAL) 100 UNIT/ML vial Inject Subcutaneous 2 times daily Inject as per  sliding scale: if 151 - 200 = 2 units; 201 - 250 = 4  units; 251 - 300 = 6 units; 301 - 350 = 8 units; 351 -  400 = 10 units; 401+ = 12 units and call medical  provider, subcutaneously two times a day for DM II       labetalol (NORMODYNE) 300 MG tablet Take 150 mg by mouth every 8 hours        melatonin 3 MG tablet Take 3 mg by mouth At Bedtime        metFORMIN (GLUCOPHAGE) 1000 MG tablet Take 1,000 mg by mouth 2 times daily (with meals)        oxyCODONE (ROXICODONE) 5 MG tablet Take 1-2 tablets (5-10 mg) by  "mouth 3 times daily as needed for severe pain (based on pain scale) 30 tablet 0     polyethylene glycol (MIRALAX) 17 GM/Dose powder Take 17 g by mouth daily as needed        senna (SENOKOT) 8.6 MG tablet Take 17.2 mg by mouth At Bedtime        tamsulosin (FLOMAX) 0.4 MG capsule Take 0.4 mg by mouth daily          REVIEW OF SYSTEMS:  10 point ROS of systems including Constitutional, Eyes, Respiratory, Cardiovascular, Gastroenterology, Genitourinary, Integumentary, Musculoskeletal, Psychiatric were all negative except for pertinent positives noted in my HPI.    Objective:  /63   Pulse 77   Temp 97.8  F (36.6  C)   Resp 16   Ht 1.753 m (5' 9\")   Wt 86.7 kg (191 lb 3.2 oz)   SpO2 94%   BMI 28.24 kg/m    Exam:  {Nursing home physical exam :953268}    Labs:   {fgslab:792925}    ASSESSMENT/PLAN:  {FGS DX:036107}      Electronically signed by:  DREW Hernandez CNP         "

## 2020-12-03 NOTE — LETTER
12/3/2020        RE: Zen Quiles  [address]  [facility] [address]        [facility]  [facility] Medical Record Number:  [id number]  Place of Service where encounter took place:  [facility]  ([facility]) [[id number]]  Chief Complaint   Patient presents with     RECHECK       HPI:    Zen Quiles  is a 60 year old (1960), who is being seen today for an episodic care visit.  HPI information obtained from: facility chart records, facility staff, patient report and [facility] chart review. PMH significant for type 2 DM and ADHD. Hospitalized 8/21/2020-9/14/2020 after falling 35 feet while hiking at [facility]. Had surgery for acetabular fracture, unstable pelvic ring and multilevel spinal fusion. Then he was discharged to [facility] from 9/14/2020-11/20/2020 where he required chest tube for empyema- completed antibiotics before discharge. He was admitted to [facility] for physical rehabilitation and medical management.     Was seen today for routine follow up at TCU. Reports pain is not being well managed. Reports no relief of pain with 10 mg dose of oxycodone. Reports pain to hands, back, buttocks, and legs. Is able to get little relief even with repositioning. Finds the bed uncomfortable as well. Is trying to work hard with therapy so he can return home before ortiz. Would like pain better controlled so he can do this. Is having trouble sleeping due to pain as well. Otherwise is without complaints today. Nursing also without complaints. Denies SOB, cough. Reports bowels moving well. Has gentile catheter in place and he tells me he has had several failed voiding trials. VS reviewed today.     Past Medical and Surgical History reviewed in Epic today.    MEDICATIONS:    Current Outpatient Medications   Medication Sig Dispense Refill     acetaminophen (TYLENOL) 500 MG tablet Take 1,000 mg by mouth 3 times daily        amLODIPine (NORVASC) 10 MG tablet Take 10 mg by  mouth daily        diclofenac (VOLTAREN) 1 % topical gel Apply 2 g topically 4 times daily       Dimethicone-Zinc Oxide-Vit A-D (A & D ZINC OXIDE) CREA Apply to perineum topically as needed for skin breakdown       enoxaparin ANTICOAGULANT (LOVENOX) 40 MG/0.4ML syringe Inject 40 mg Subcutaneous daily       gabapentin (NEURONTIN) 300 MG capsule Take 400 mg by mouth 3 times daily       glucagon 1 MG kit Inject 1 mg into the muscle as needed for low blood sugar        glucose 40 % (400 mg/mL) gel Take 15 g by mouth every hour as needed for low blood sugar        insulin glargine (LANTUS VIAL) 100 UNIT/ML vial Inject 20 Units Subcutaneous daily       insulin lispro (HUMALOG VIAL) 100 UNIT/ML vial Inject Subcutaneous 2 times daily Inject as per  sliding scale: if 151 - 200 = 2 units; 201 - 250 = 4  units; 251 - 300 = 6 units; 301 - 350 = 8 units; 351 -  400 = 10 units; 401+ = 12 units and call medical  provider, subcutaneously two times a day for DM II       labetalol (NORMODYNE) 300 MG tablet Take 150 mg by mouth every 8 hours        Lidocaine (LIDOCARE) 4 % Patch Place 2 patches onto the skin every 24 hours To prevent lidocaine toxicity, patient should be patch free for 12 hrs daily. 2 patch 0     melatonin 3 MG tablet Take 3 mg by mouth At Bedtime        metFORMIN (GLUCOPHAGE) 1000 MG tablet Take 1,000 mg by mouth 2 times daily (with meals)        oxyCODONE (ROXICODONE) 5 MG tablet Take 1-2 tablets (5-10 mg) by mouth 3 times daily as needed for severe pain (based on pain scale) 30 tablet 0     polyethylene glycol (MIRALAX) 17 GM/Dose powder Take 17 g by mouth daily as needed        senna (SENOKOT) 8.6 MG tablet Take 17.2 mg by mouth At Bedtime        tamsulosin (FLOMAX) 0.4 MG capsule Take 0.4 mg by mouth daily          REVIEW OF SYSTEMS:  10 point ROS of systems including Constitutional, Eyes, Respiratory, Cardiovascular, Gastroenterology, Genitourinary, Integumentary, Musculoskeletal, Psychiatric were all negative  "except for pertinent positives noted in my HPI.    Objective:  /63   Pulse 77   Temp 97.8  F (36.6  C)   Resp 16   Ht 1.753 m (5' 9\")   Wt 86.7 kg (191 lb 3.2 oz)   SpO2 94%   BMI 28.24 kg/m    Exam:  GENERAL APPEARANCE:  Alert, pleasant and cooperative, male resting in bed on exam today  HEENT: normocephalic, conjunctivae, lids, pupils and irises normal, moist mucous membranes, nose without drainage or crusting  RESP:  respiratory effort normal, no respiratory distress, Lung sounds clear, patient is on RA  CV: auscultation of heart done, rate and rhythm regular. no murmur, no rub or gallop.   ABDOMEN: + bowel sounds, soft, nontender, no grimacing or guarding with palpation.  M/S: No peripheral edema. Limited ability to move RLE beyond toes  NEURO: no facial asymmetry, no speech deficits and able to follow directions, cranial nerves 2-12 grossly intact  PSYCH: oriented x 3, affect and mood normal    Labs:   Labs done in SNF are in [place] EPIC. Please refer to them using [facility]/Care Everywhere.    ASSESSMENT/PLAN:  (S32.401D) Closed nondisplaced fracture of right acetabulum with routine healing, unspecified portion of acetabulum, subsequent encounter  (primary encounter diagnosis)  (S32.701D) Multiple closed fractures of pelvis with stable disruption of pelvic ring with routine healing, subsequent encounter  Comment: secondary to fall- pain not well managed  Plan: Discussed pain regimen with pharmacist today and will try to increase gabapentin to 400 mg TID. Consider duloxetine if no improvement in pain with increased gabapentin at follow up visit.  Continue Tylenol 1000 mg po TID, oxycodone TID PRN. Continue DVT prophy with lovenox 40 mg daily. TTWB to RLE. Therapy as below. Follow up with orthopedics. Did check with facility to see if patient would qualify for a larger bed to assist with comfort as well.     (S06.9X9S) Traumatic brain injury with loss of consciousness, sequela (H)  Encephalopathy " secondary to TBI  Left temporal SAH  Left frontotemporalparietal Subdural hematoma  Comment: oriented x3 today, cognition back to baseline  Plan: OCCUPATIONAL THERAPY to complete cognitive testing    (Z98.1) History of thoracic spinal fusion T8-L2 8/24/2020  Closed fracture of C6&C7  Closed fracture of T10-T11  Comment: secondary to fall, pain currently not well managed   Plan: Start lidocaine patch, voltaren gel for pain. Discussed pain regimen with pharmacist today and will try to increase gabapentin to 400 mg TID as above. Consider duloxetine if no improvement in pain with increased gabapentin at follow up visit. Continue Tylenol 1000 mg po TID, oxycodone PRN. Continue DVT prophy with lovenox 40 mg daily. TLSO when out of bed. Follow up with neurosurgery.    (E11.9,  Z79.4) Type 2 diabetes mellitus without complication, with long-term current use of insulin (H)  Comment: chronic, BS with several low BS since admission- most BS <200  Plan: Continue lantus 20 units subcutaneous daily. Continue lispro sliding scale with meals. Continue metformin 1000 mg BID with meals. QID BS. Avoid hypoglycemia.    (R13.10) Dysphagia, unspecified type  Comment: acute, reports no concerns regarding eating  Plan: Continue regular diet with NTL. SLP following    (I10) Essential hypertension  Comment: chronic, BP 120s-150s  Plan: Continue amlodipine 10 mg daily, labetalol 300 mg daily. VS per TCU policy. Adjust medications as needed.    (R33.9) Urine retention  (Z97.8) Nevarez catheter in place on admission  Comment:   Plan: Continue tamsulosin. Nevarez cares per nursing staff. Voiding trial when more mobile    (R53.81) Physical deconditioning  Comment: Acute, secondary to recent hospitalization, medical conditions as above  Plan: Encourage participation in physical therapy/occupational therapy for strengthening and deconditioning. Discharge planning per their recommendation. Social work to assist with d/c planning.    Electronically  signed by:  DREW Hernandez CNP               Sincerely,        Miladys Daigle, DREW CNP

## 2020-12-03 NOTE — PATIENT INSTRUCTIONS
Orders  Zen Quiles  1960    1) Lidocaine patch 4%. Apply 2 patches to back pain. Place on at 8AM, Remove 8PM. No patch in place x12 hours. Diagnosis: back pain  2) Voltaren gel 1%. Apply 2 grams to bilateral hands for pain QID for pain. Diagnosis: hand pain  3) Discontinue current orders for gabapentin  4) Start gabapentin 400 mg TID. Diagnosis: pain  5) Continue current oxycodone orders.    DREW Hernandez CNP on 12/3/2020 at 3:14 PM

## 2020-12-03 NOTE — PROGRESS NOTES
[facility]  [place] Medical Record Number:  [id number]  Place of Service where encounter took place:  [facility]  ([facility]) [[id number]]  Chief Complaint   Patient presents with     RECHECK       HPI:    Zen Quiles  is a 60 year old (1960), who is being seen today for an episodic care visit.  HPI information obtained from: facility chart records, facility staff, patient report and [facility] chart review. PMH significant for type 2 DM and ADHD. Hospitalized 8/21/2020-9/14/2020 after falling 35 feet while hiking at [facility]. Had surgery for acetabular fracture, unstable pelvic ring and multilevel spinal fusion. Then he was discharged to [facility] from 9/14/2020-11/20/2020 where he required chest tube for empyema- completed antibiotics before discharge. He was admitted to [facility] for physical rehabilitation and medical management.     Was seen today for routine follow up at TCU. Reports pain is not being well managed. Reports no relief of pain with 10 mg dose of oxycodone. Reports pain to hands, back, buttocks, and legs. Is able to get little relief even with repositioning. Finds the bed uncomfortable as well. Is trying to work hard with therapy so he can return home before ortiz. Would like pain better controlled so he can do this. Is having trouble sleeping due to pain as well. Otherwise is without complaints today. Nursing also without complaints. Denies SOB, cough. Reports bowels moving well. Has gentile catheter in place and he tells me he has had several failed voiding trials. VS reviewed today.     Past Medical and Surgical History reviewed in Epic today.    MEDICATIONS:    Current Outpatient Medications   Medication Sig Dispense Refill     acetaminophen (TYLENOL) 500 MG tablet Take 1,000 mg by mouth 3 times daily        amLODIPine (NORVASC) 10 MG tablet Take 10 mg by mouth daily        diclofenac (VOLTAREN) 1 % topical gel Apply 2 g topically 4 times  daily       Dimethicone-Zinc Oxide-Vit A-D (A & D ZINC OXIDE) CREA Apply to perineum topically as needed for skin breakdown       enoxaparin ANTICOAGULANT (LOVENOX) 40 MG/0.4ML syringe Inject 40 mg Subcutaneous daily       gabapentin (NEURONTIN) 300 MG capsule Take 400 mg by mouth 3 times daily       glucagon 1 MG kit Inject 1 mg into the muscle as needed for low blood sugar        glucose 40 % (400 mg/mL) gel Take 15 g by mouth every hour as needed for low blood sugar        insulin glargine (LANTUS VIAL) 100 UNIT/ML vial Inject 20 Units Subcutaneous daily       insulin lispro (HUMALOG VIAL) 100 UNIT/ML vial Inject Subcutaneous 2 times daily Inject as per  sliding scale: if 151 - 200 = 2 units; 201 - 250 = 4  units; 251 - 300 = 6 units; 301 - 350 = 8 units; 351 -  400 = 10 units; 401+ = 12 units and call medical  provider, subcutaneously two times a day for DM II       labetalol (NORMODYNE) 300 MG tablet Take 150 mg by mouth every 8 hours        Lidocaine (LIDOCARE) 4 % Patch Place 2 patches onto the skin every 24 hours To prevent lidocaine toxicity, patient should be patch free for 12 hrs daily. 2 patch 0     melatonin 3 MG tablet Take 3 mg by mouth At Bedtime        metFORMIN (GLUCOPHAGE) 1000 MG tablet Take 1,000 mg by mouth 2 times daily (with meals)        oxyCODONE (ROXICODONE) 5 MG tablet Take 1-2 tablets (5-10 mg) by mouth 3 times daily as needed for severe pain (based on pain scale) 30 tablet 0     polyethylene glycol (MIRALAX) 17 GM/Dose powder Take 17 g by mouth daily as needed        senna (SENOKOT) 8.6 MG tablet Take 17.2 mg by mouth At Bedtime        tamsulosin (FLOMAX) 0.4 MG capsule Take 0.4 mg by mouth daily          REVIEW OF SYSTEMS:  10 point ROS of systems including Constitutional, Eyes, Respiratory, Cardiovascular, Gastroenterology, Genitourinary, Integumentary, Musculoskeletal, Psychiatric were all negative except for pertinent positives noted in my HPI.    Objective:  /63   Pulse 77   " Temp 97.8  F (36.6  C)   Resp 16   Ht 1.753 m (5' 9\")   Wt 86.7 kg (191 lb 3.2 oz)   SpO2 94%   BMI 28.24 kg/m    Exam:  GENERAL APPEARANCE:  Alert, pleasant and cooperative, male resting in bed on exam today  HEENT: normocephalic, conjunctivae, lids, pupils and irises normal, moist mucous membranes, nose without drainage or crusting  RESP:  respiratory effort normal, no respiratory distress, Lung sounds clear, patient is on RA  CV: auscultation of heart done, rate and rhythm regular. no murmur, no rub or gallop.   ABDOMEN: + bowel sounds, soft, nontender, no grimacing or guarding with palpation.  M/S: No peripheral edema. Limited ability to move RLE beyond toes  NEURO: no facial asymmetry, no speech deficits and able to follow directions, cranial nerves 2-12 grossly intact  PSYCH: oriented x 3, affect and mood normal    Labs:   Labs done in SNF are in [place] EPIC. Please refer to them using [facility] Everywhere.    ASSESSMENT/PLAN:  (S32.401D) Closed nondisplaced fracture of right acetabulum with routine healing, unspecified portion of acetabulum, subsequent encounter  (primary encounter diagnosis)  (S32.810D) Multiple closed fractures of pelvis with stable disruption of pelvic ring with routine healing, subsequent encounter  Comment: secondary to fall- pain not well managed  Plan: Discussed pain regimen with pharmacist today and will try to increase gabapentin to 400 mg TID. Consider duloxetine if no improvement in pain with increased gabapentin at follow up visit.  Continue Tylenol 1000 mg po TID, oxycodone TID PRN. Continue DVT prophy with lovenox 40 mg daily. TTWB to RLE. Therapy as below. Follow up with orthopedics. Did check with facility to see if patient would qualify for a larger bed to assist with comfort as well.     (S06.9X9S) Traumatic brain injury with loss of consciousness, sequela (H)  Encephalopathy secondary to TBI  Left temporal SAH  Left frontotemporalparietal Subdural " hematoma  Comment: oriented x3 today, cognition back to baseline  Plan: OCCUPATIONAL THERAPY to complete cognitive testing    (Z98.1) History of thoracic spinal fusion T8-L2 8/24/2020  Closed fracture of C6&C7  Closed fracture of T10-T11  Comment: secondary to fall, pain currently not well managed   Plan: Start lidocaine patch, voltaren gel for pain. Discussed pain regimen with pharmacist today and will try to increase gabapentin to 400 mg TID as above. Consider duloxetine if no improvement in pain with increased gabapentin at follow up visit. Continue Tylenol 1000 mg po TID, oxycodone PRN. Continue DVT prophy with lovenox 40 mg daily. TLSO when out of bed. Follow up with neurosurgery.    (E11.9,  Z79.4) Type 2 diabetes mellitus without complication, with long-term current use of insulin (H)  Comment: chronic, BS with several low BS since admission- most BS <200  Plan: Continue lantus 20 units subcutaneous daily. Continue lispro sliding scale with meals. Continue metformin 1000 mg BID with meals. QID BS. Avoid hypoglycemia.    (R13.10) Dysphagia, unspecified type  Comment: acute, reports no concerns regarding eating  Plan: Continue regular diet with NTL. SLP following    (I10) Essential hypertension  Comment: chronic, BP 120s-150s  Plan: Continue amlodipine 10 mg daily, labetalol 300 mg daily. VS per TCU policy. Adjust medications as needed.    (R33.9) Urine retention  (Z97.8) Nevarez catheter in place on admission  Comment:   Plan: Continue tamsulosin. Nevarez cares per nursing staff. Voiding trial when more mobile    (R53.81) Physical deconditioning  Comment: Acute, secondary to recent hospitalization, medical conditions as above  Plan: Encourage participation in physical therapy/occupational therapy for strengthening and deconditioning. Discharge planning per their recommendation. Social work to assist with d/c planning.    Electronically signed by:  DREW Hernandez CNP

## 2020-12-08 ENCOUNTER — HOSPITAL LABORATORY (OUTPATIENT)
Dept: OTHER | Facility: CLINIC | Age: 60
End: 2020-12-08

## 2020-12-09 LAB
SARS-COV-2 RNA SPEC QL NAA+PROBE: NOT DETECTED
SPECIMEN SOURCE: NORMAL

## 2020-12-10 ENCOUNTER — VIRTUAL VISIT (OUTPATIENT)
Dept: GERIATRICS | Facility: CLINIC | Age: 60
End: 2020-12-10
Payer: COMMERCIAL

## 2020-12-10 VITALS
DIASTOLIC BLOOD PRESSURE: 73 MMHG | HEART RATE: 96 BPM | HEIGHT: 69 IN | SYSTOLIC BLOOD PRESSURE: 123 MMHG | TEMPERATURE: 97.8 F | RESPIRATION RATE: 16 BRPM | OXYGEN SATURATION: 95 % | WEIGHT: 194 LBS | BODY MASS INDEX: 28.73 KG/M2

## 2020-12-10 DIAGNOSIS — Z53.9 ERRONEOUS ENCOUNTER--DISREGARD: Primary | ICD-10-CM

## 2020-12-10 ASSESSMENT — MIFFLIN-ST. JEOR: SCORE: 1685.36

## 2020-12-11 DIAGNOSIS — S32.401D CLOSED DISPLACED FRACTURE OF RIGHT ACETABULUM WITH ROUTINE HEALING, UNSPECIFIED PORTION OF ACETABULUM, SUBSEQUENT ENCOUNTER: ICD-10-CM

## 2020-12-11 DIAGNOSIS — S22.009D: ICD-10-CM

## 2020-12-11 DIAGNOSIS — S13.4XXD INJURY TO LIGAMENT OF CERVICAL SPINE, SUBSEQUENT ENCOUNTER: ICD-10-CM

## 2020-12-11 DIAGNOSIS — S32.810D CLOSED PELVIC RING FRACTURE WITH ROUTINE HEALING, SUBSEQUENT ENCOUNTER: ICD-10-CM

## 2020-12-11 DIAGNOSIS — S12.9XXD CLOSED FRACTURE OF CERVICAL VERTEBRA, UNSPECIFIED CERVICAL VERTEBRAL LEVEL, SUBSEQUENT ENCOUNTER: ICD-10-CM

## 2020-12-11 RX ORDER — OXYCODONE HYDROCHLORIDE 5 MG/1
5 TABLET ORAL EVERY 8 HOURS PRN
Qty: 30 TABLET | Refills: 0 | Status: SHIPPED | OUTPATIENT
Start: 2020-12-11 | End: 2020-12-24

## 2020-12-14 ENCOUNTER — NURSING HOME VISIT (OUTPATIENT)
Dept: GERIATRICS | Facility: CLINIC | Age: 60
End: 2020-12-14
Payer: COMMERCIAL

## 2020-12-14 VITALS
BODY MASS INDEX: 28.73 KG/M2 | RESPIRATION RATE: 18 BRPM | HEIGHT: 69 IN | OXYGEN SATURATION: 98 % | SYSTOLIC BLOOD PRESSURE: 143 MMHG | HEART RATE: 81 BPM | WEIGHT: 194 LBS | TEMPERATURE: 97.6 F | DIASTOLIC BLOOD PRESSURE: 77 MMHG

## 2020-12-14 DIAGNOSIS — I10 ESSENTIAL HYPERTENSION: ICD-10-CM

## 2020-12-14 DIAGNOSIS — Z79.4 TYPE 2 DIABETES MELLITUS WITHOUT COMPLICATION, WITH LONG-TERM CURRENT USE OF INSULIN (H): ICD-10-CM

## 2020-12-14 DIAGNOSIS — R29.898 BILATERAL LEG WEAKNESS: ICD-10-CM

## 2020-12-14 DIAGNOSIS — S06.9X9S TRAUMATIC BRAIN INJURY WITH LOSS OF CONSCIOUSNESS, SEQUELA (H): ICD-10-CM

## 2020-12-14 DIAGNOSIS — S32.401D CLOSED NONDISPLACED FRACTURE OF RIGHT ACETABULUM WITH ROUTINE HEALING, UNSPECIFIED PORTION OF ACETABULUM, SUBSEQUENT ENCOUNTER: Primary | ICD-10-CM

## 2020-12-14 DIAGNOSIS — S32.810D MULTIPLE CLOSED FRACTURES OF PELVIS WITH STABLE DISRUPTION OF PELVIC RING WITH ROUTINE HEALING, SUBSEQUENT ENCOUNTER: ICD-10-CM

## 2020-12-14 DIAGNOSIS — R13.10 DYSPHAGIA, UNSPECIFIED TYPE: ICD-10-CM

## 2020-12-14 DIAGNOSIS — R53.81 PHYSICAL DECONDITIONING: ICD-10-CM

## 2020-12-14 DIAGNOSIS — Z98.1 HISTORY OF THORACIC SPINAL FUSION: ICD-10-CM

## 2020-12-14 DIAGNOSIS — R33.9 URINE RETENTION: ICD-10-CM

## 2020-12-14 DIAGNOSIS — E11.9 TYPE 2 DIABETES MELLITUS WITHOUT COMPLICATION, WITH LONG-TERM CURRENT USE OF INSULIN (H): ICD-10-CM

## 2020-12-14 PROCEDURE — 99309 SBSQ NF CARE MODERATE MDM 30: CPT | Performed by: NURSE PRACTITIONER

## 2020-12-14 ASSESSMENT — MIFFLIN-ST. JEOR: SCORE: 1685.36

## 2020-12-14 NOTE — LETTER
"    12/14/2020        RE: Zen Quiles  [address]  [facility] [id number]        [facility]  [place] Medical Record Number:  [id number]  Place of Service where encounter took place:  [facility]  ([facility]) [[id number]]  Chief Complaint   Patient presents with     RECHECK       HPI:    Zen Quiles  is a 60 year old (1960), who is being seen today for an episodic care visit.  HPI information obtained from: facility chart records, facility staff, patient report and [facility] chart review. Today's concern is:    Patient in TCU following hospitalizations 2/2 fall and multiple trauma and associated complications. In rehab has been medically stable and making functional gains. VS reviewed.     Today patient is found in room lying in bed. Alert, calm, NAD. Reports ongoing pain mainly to hands, feet and buttocks. Reports pain in hands is 'always there\". Unable to qualify particularly. Denies n/t.  Reports current pain regimen wears off before can have more and is requesting increase in oxycodone. Reports otherwise doing well and making gains in rehab. VS reviewed.     Past Medical and Surgical History reviewed in Epic today.    MEDICATIONS:      Current Outpatient Medications   Medication Sig Dispense Refill     acetaminophen (TYLENOL) 500 MG tablet Take 1,000 mg by mouth 3 times daily        amLODIPine (NORVASC) 10 MG tablet Take 10 mg by mouth daily        diclofenac (VOLTAREN) 1 % topical gel Apply 2 g topically 4 times daily       Dimethicone-Zinc Oxide-Vit A-D (A & D ZINC OXIDE) CREA Apply to perineum topically as needed for skin breakdown       enoxaparin ANTICOAGULANT (LOVENOX) 40 MG/0.4ML syringe Inject 40 mg Subcutaneous daily       gabapentin (NEURONTIN) 300 MG capsule Take 400 mg by mouth 3 times daily       glucagon 1 MG kit Inject 1 mg into the muscle as needed for low blood sugar        glucose 40 % (400 mg/mL) gel Take 15 g by mouth every hour as needed for low blood " "sugar        insulin glargine (LANTUS VIAL) 100 UNIT/ML vial Inject 20 Units Subcutaneous daily       insulin lispro (HUMALOG VIAL) 100 UNIT/ML vial Inject Subcutaneous 2 times daily Inject as per  sliding scale: if 151 - 200 = 2 units; 201 - 250 = 4  units; 251 - 300 = 6 units; 301 - 350 = 8 units; 351 -  400 = 10 units; 401+ = 12 units and call medical  provider, subcutaneously two times a day for DM II       labetalol (NORMODYNE) 300 MG tablet Take 150 mg by mouth every 8 hours        Lidocaine (LIDOCARE) 4 % Patch Place 2 patches onto the skin every 24 hours To prevent lidocaine toxicity, patient should be patch free for 12 hrs daily. 2 patch 0     melatonin 3 MG tablet Take 3 mg by mouth At Bedtime        metFORMIN (GLUCOPHAGE) 1000 MG tablet Take 1,000 mg by mouth 2 times daily (with meals)        oxyCODONE (ROXICODONE) 5 MG tablet Take 1 tablet (5 mg) by mouth every 8 hours as needed for severe pain (based on pain scale) 30 tablet 0     polyethylene glycol (MIRALAX) 17 GM/Dose powder Take 17 g by mouth daily as needed        senna (SENOKOT) 8.6 MG tablet Take 17.2 mg by mouth At Bedtime        tamsulosin (FLOMAX) 0.4 MG capsule Take 0.4 mg by mouth daily            REVIEW OF SYSTEMS:  4 point ROS including Respiratory, CV, GI and , other than that noted in the HPI,  is negative    Objective:  BP (!) 143/77   Pulse 81   Temp 97.6  F (36.4  C)   Resp 18   Ht 1.753 m (5' 9\")   Wt 88 kg (194 lb)   SpO2 98%   BMI 28.65 kg/m    Exam:    Resp: Effort WNL  CV: VS as above- no edema noted  Abd- soft, nontender, BS +  Musc- MOORE  Psych- alert, calm, pleasant      Labs:   Recent labs in [facility] reviewed by me today.     ASSESSMENT/PLAN:  Closed nondisplaced fracture of right acetabulum with routine healing, unspecified portion of acetabulum, subsequent encounter  Multiple closed fractures of pelvis with stable disruption of pelvic ring with routine healing, subsequent encounter  Bilateral LE weakness  - 2/2 fall in " 2020. Was NWB, TTWB 4 months RLE. Now healing without issue. But bilateral LE weakness persists. Patient reports persistent pain to hands and feet, buttock at times. ? Critical illness myopathy    Traumatic brain injury with loss of consciousness, sequela (H)  - h/o left temoral SAH and left frontotemporal parietal SDH  - some slow speech and dry/hoarse throat but oriented x 3 today.   - continues to work with OT/ST     History of thoracic spinal fusion  Closed fracture of C6&C7  Closed fracture of T10-T11  - after fall  Bilateral LE weakness  - pain has remained of issue. Oxycodone and Xanax have been tapered in TCU.   - continue on lidocaine patch, voltaren gel, increase Gabapentin HS dose to 600 mg  - continue scheduled acetaminophen and TID prn oxycodone  - Is to wear TLSO when out of bed.   - f/w neurosurgery as directed  - w/c     Type 2 diabetes mellitus without complication, with long-term current use of insulin (H)  - controlled, BGL 100s  - continue on Lantus 20 units, metformin  - discontinue sliding scale insulin  - BGL ac and HS      Dysphagia, unspecified type  - working with ST  - Reg diet with NTL, aspiration precautions    Essential hypertension  - controlled  - continue on amlodipine, labetalol    Urine retention  -has Nevarez catheter- nursing managing  - voiding trial next week now that is moving around better    Physical deconditioning  - 2/2 above  - PT/OT   - ongoing discharge planning, SW follow and care conferences per unit protocol          Electronically signed by:  DREW Craig CNP       Face to Face and Medical Necessity Statement for DME Provider visit    Demographic Information on Zen Quiles:  Gender: male  : 1960  [address]  [place] [id number] [phone] (home)     Medical Record: [id number]  Social Security Number: [id number]  Primary Care Provider: Lon Addison  Insurance: Payor: [employer] / Plan: [employer]  COMMERCIAL / Product Type: HMO /     HPI:   Zen Quiles is a 60 year old  (1960), who is being seen today for a face to face provider visit at [facility]; medical necessity statement for DME included. This patient requires the following:  DME Ordered and Medical Necessity Statement     Wheelchair Documentation  Size: 20 W x 18D standard manual w/c  Corresponding cushion: Yes: comfort and skin integrity  Standard foot rests: Yes  Elevating leg rests: Yes  Arm rests: Yes: full length  Lap tray: No  Dose the patient use oxygen? No   Is the patient able to propel wheelchair? Yes   1. The patient has mobility limitations that impairs their ability to participate in one or more mobility related activities: Toileting, Feeding, Grooming and Bathing.  The wheelchair is suitable and necessary for use in the patient's home.  2. The patient's mobility limitations cannot be safely resolved by using a cane/walker:No    Reason why a cane or walker will not meet the patient's needs. (ie: balance, tolerance, level of assistance) Patient requires assistance with all walker mobility d/t bilateral LE weakness and impaired coordination. W/c is necessary to enable safe participation in MRADLs and ambulation and decrease risk for falls  3. The patients home has adequate access to use a manual wheelchair:Yes  4. The use of a manual wheelchair on a regular basis will improve the patients ability to participate in mobility related ADL's at home:Yes  5. The patient is willing to use a manual wheelchair at home:Yes  6. The patient has adequate upper body strength and the mental capability to safely use a manual wheelchair and/or has a caregiver that is able to assist: Yes  7. Does the patient have a lower extremity injury or edema?Yes  Reason for Type of Wheelchair  Patient weight: 191 lbs            Pt needing above DME with expected length of need of 99 monthsdue to medical necessity associated with following diagnosis:    "  Closed nondisplaced fracture of right acetabulum with routine healing, unspecified portion of acetabulum, subsequent encounter  Multiple closed fractures of pelvis with stable disruption of pelvic ring with routine healing, subsequent encounter  Bilateral leg weakness  Traumatic brain injury with loss of consciousness, sequela (H)  History of thoracic spinal fusion        PM   has no past medical history on file.    ROS:as above    EXAM  Vitals: BP (!) 143/77   Pulse 81   Temp 97.6  F (36.4  C)   Resp 18   Ht 1.753 m (5' 9\")   Wt 88 kg (194 lb)   SpO2 98%   BMI 28.65 kg/m  ;BMI= Body mass index is 28.65 kg/m .   As above     ASSESSMENT/PLAN:  1. Closed nondisplaced fracture of right acetabulum with routine healing, unspecified portion of acetabulum, subsequent encounter    2. Multiple closed fractures of pelvis with stable disruption of pelvic ring with routine healing, subsequent encounter    3. Bilateral leg weakness    4. Traumatic brain injury with loss of consciousness, sequela (H)    5. History of thoracic spinal fusion        Orders:  1. DME as above    ELECTRONICALLY SIGNED BY EDIS CERTIFIED PROVIDER:  DREW Craig CNP   NPI: [id number]  [facility]  [address] [place] [address], [address]  [place] [address]                          Sincerely,        DREW Craig CNP    "

## 2020-12-14 NOTE — PROGRESS NOTES
"[facility]  [place] [facility] Record Number:  [id number]  Place of Service where encounter took place:  [facility]  ([facility]) [[id number]]  Chief Complaint   Patient presents with     RECHECK       HPI:    Zen Quiles  is a 60 year old (1960), who is being seen today for an episodic care visit.  HPI information obtained from: facility chart records, facility staff, patient report and [facility] chart review. Today's concern is:    Patient in TCU following hospitalizations 2/2 fall and multiple trauma and associated complications. In rehab has been medically stable and making functional gains. VS reviewed.     Today patient is found in room lying in bed. Alert, calm, NAD. Reports ongoing pain mainly to hands, feet and buttocks. Reports pain in hands is 'always there\". Unable to qualify particularly. Denies n/t.  Reports current pain regimen wears off before can have more and is requesting increase in oxycodone. Reports otherwise doing well and making gains in rehab. VS reviewed.     Past Medical and Surgical History reviewed in Epic today.    MEDICATIONS:      Current Outpatient Medications   Medication Sig Dispense Refill     acetaminophen (TYLENOL) 500 MG tablet Take 1,000 mg by mouth 3 times daily        amLODIPine (NORVASC) 10 MG tablet Take 10 mg by mouth daily        diclofenac (VOLTAREN) 1 % topical gel Apply 2 g topically 4 times daily       Dimethicone-Zinc Oxide-Vit A-D (A & D ZINC OXIDE) CREA Apply to perineum topically as needed for skin breakdown       enoxaparin ANTICOAGULANT (LOVENOX) 40 MG/0.4ML syringe Inject 40 mg Subcutaneous daily       gabapentin (NEURONTIN) 300 MG capsule Take 400 mg by mouth 3 times daily       glucagon 1 MG kit Inject 1 mg into the muscle as needed for low blood sugar        glucose 40 % (400 mg/mL) gel Take 15 g by mouth every hour as needed for low blood sugar        insulin glargine (LANTUS VIAL) 100 UNIT/ML vial Inject 20 Units Subcutaneous " "daily       insulin lispro (HUMALOG VIAL) 100 UNIT/ML vial Inject Subcutaneous 2 times daily Inject as per  sliding scale: if 151 - 200 = 2 units; 201 - 250 = 4  units; 251 - 300 = 6 units; 301 - 350 = 8 units; 351 -  400 = 10 units; 401+ = 12 units and call medical  provider, subcutaneously two times a day for DM II       labetalol (NORMODYNE) 300 MG tablet Take 150 mg by mouth every 8 hours        Lidocaine (LIDOCARE) 4 % Patch Place 2 patches onto the skin every 24 hours To prevent lidocaine toxicity, patient should be patch free for 12 hrs daily. 2 patch 0     melatonin 3 MG tablet Take 3 mg by mouth At Bedtime        metFORMIN (GLUCOPHAGE) 1000 MG tablet Take 1,000 mg by mouth 2 times daily (with meals)        oxyCODONE (ROXICODONE) 5 MG tablet Take 1 tablet (5 mg) by mouth every 8 hours as needed for severe pain (based on pain scale) 30 tablet 0     polyethylene glycol (MIRALAX) 17 GM/Dose powder Take 17 g by mouth daily as needed        senna (SENOKOT) 8.6 MG tablet Take 17.2 mg by mouth At Bedtime        tamsulosin (FLOMAX) 0.4 MG capsule Take 0.4 mg by mouth daily            REVIEW OF SYSTEMS:  4 point ROS including Respiratory, CV, GI and , other than that noted in the HPI,  is negative    Objective:  BP (!) 143/77   Pulse 81   Temp 97.6  F (36.4  C)   Resp 18   Ht 1.753 m (5' 9\")   Wt 88 kg (194 lb)   SpO2 98%   BMI 28.65 kg/m    Exam:    Resp: Effort WNL  CV: VS as above- no edema noted  Abd- soft, nontender, BS +  Musc- MOORE  Psych- alert, calm, pleasant      Labs:   Recent labs in [facility] reviewed by me today.     ASSESSMENT/PLAN:  Closed nondisplaced fracture of right acetabulum with routine healing, unspecified portion of acetabulum, subsequent encounter  Multiple closed fractures of pelvis with stable disruption of pelvic ring with routine healing, subsequent encounter  Bilateral LE weakness  - 2/2 fall in 8/2020. Was NWB, TTWB 4 months RLE. Now healing without issue. But bilateral LE weakness " persists. Patient reports persistent pain to hands and feet, buttock at times. ? Critical illness myopathy    Traumatic brain injury with loss of consciousness, sequela (H)  - h/o left temoral SAH and left frontotemporal parietal SDH  - some slow speech and dry/hoarse throat but oriented x 3 today.   - continues to work with OT/ST     History of thoracic spinal fusion  Closed fracture of C6&C7  Closed fracture of T10-T11  - after fall  Bilateral LE weakness  - pain has remained of issue. Oxycodone and Xanax have been tapered in TCU.   - continue on lidocaine patch, voltaren gel, increase Gabapentin HS dose to 600 mg  - continue scheduled acetaminophen and TID prn oxycodone  - Is to wear TLSO when out of bed.   - f/w neurosurgery as directed  - w/c     Type 2 diabetes mellitus without complication, with long-term current use of insulin (H)  - controlled, BGL 100s  - continue on Lantus 20 units, metformin  - discontinue sliding scale insulin  - BGL ac and HS      Dysphagia, unspecified type  - working with ST  - Reg diet with NTL, aspiration precautions    Essential hypertension  - controlled  - continue on amlodipine, labetalol    Urine retention  -has Nevarez catheter- nursing managing  - voiding trial next week now that is moving around better    Physical deconditioning  - 2/2 above  - PT/OT   - ongoing discharge planning, SW follow and care conferences per unit protocol          Electronically signed by:  DREW Craig CNP       Face to Face and Medical Necessity Statement for DME Provider visit    Demographic Information on Zen Quiles:  Gender: male  : 1960  [address] [facility] [address] [phone] (home)     Medical Record: [id number]  Social Security Number: [id number]  Primary Care Provider: Lon Addison  Insurance: Payor: [employer] / Plan: [employer] / Product Type: HMO /     HPI:   Zen Quiles is a 60 year old  (1960), who is  being seen today for a face to face provider visit at [facility]; medical necessity statement for DME included. This patient requires the following:  DME Ordered and Medical Necessity Statement     Wheelchair Documentation  Size: 20 W x 18D standard manual w/c  Corresponding cushion: Yes: comfort and skin integrity  Standard foot rests: Yes  Elevating leg rests: Yes  Arm rests: Yes: full length  Lap tray: No  Dose the patient use oxygen? No   Is the patient able to propel wheelchair? Yes   1. The patient has mobility limitations that impairs their ability to participate in one or more mobility related activities: Toileting, Feeding, Grooming and Bathing.  The wheelchair is suitable and necessary for use in the patient's home.  2. The patient's mobility limitations cannot be safely resolved by using a cane/walker:No    Reason why a cane or walker will not meet the patient's needs. (ie: balance, tolerance, level of assistance) Patient requires assistance with all walker mobility d/t bilateral LE weakness and impaired coordination. W/c is necessary to enable safe participation in MRADLs and ambulation and decrease risk for falls  3. The patients home has adequate access to use a manual wheelchair:Yes  4. The use of a manual wheelchair on a regular basis will improve the patients ability to participate in mobility related ADL's at home:Yes  5. The patient is willing to use a manual wheelchair at home:Yes  6. The patient has adequate upper body strength and the mental capability to safely use a manual wheelchair and/or has a caregiver that is able to assist: Yes  7. Does the patient have a lower extremity injury or edema?Yes  Reason for Type of Wheelchair  Patient weight: 191 lbs            Pt needing above DME with expected length of need of 99 monthsdue to medical necessity associated with following diagnosis:     Closed nondisplaced fracture of right acetabulum with routine healing, unspecified portion of  "acetabulum, subsequent encounter  Multiple closed fractures of pelvis with stable disruption of pelvic ring with routine healing, subsequent encounter  Bilateral leg weakness  Traumatic brain injury with loss of consciousness, sequela (H)  History of thoracic spinal fusion        [facility]   has no past medical history on file.    ROS:as above    EXAM  Vitals: BP (!) 143/77   Pulse 81   Temp 97.6  F (36.4  C)   Resp 18   Ht 1.753 m (5' 9\")   Wt 88 kg (194 lb)   SpO2 98%   BMI 28.65 kg/m  ;BMI= Body mass index is 28.65 kg/m .   As above     ASSESSMENT/PLAN:  1. Closed nondisplaced fracture of right acetabulum with routine healing, unspecified portion of acetabulum, subsequent encounter    2. Multiple closed fractures of pelvis with stable disruption of pelvic ring with routine healing, subsequent encounter    3. Bilateral leg weakness    4. Traumatic brain injury with loss of consciousness, sequela (H)    5. History of thoracic spinal fusion        Orders:  1. DME as above    ELECTRONICALLY SIGNED BY PECOS CERTIFIED PROVIDER:  DREW Craig CNP   NPI: [id number]  [facility]  [address] [place] [address], [address]  [place] [address]                    "

## 2020-12-22 ENCOUNTER — HOSPITAL LABORATORY (OUTPATIENT)
Dept: OTHER | Facility: CLINIC | Age: 60
End: 2020-12-22

## 2020-12-23 ENCOUNTER — DISCHARGE SUMMARY NURSING HOME (OUTPATIENT)
Dept: GERIATRICS | Facility: CLINIC | Age: 60
End: 2020-12-23
Payer: COMMERCIAL

## 2020-12-23 VITALS
OXYGEN SATURATION: 97 % | TEMPERATURE: 98.2 F | RESPIRATION RATE: 18 BRPM | DIASTOLIC BLOOD PRESSURE: 66 MMHG | WEIGHT: 195.4 LBS | SYSTOLIC BLOOD PRESSURE: 114 MMHG | HEART RATE: 83 BPM | HEIGHT: 69 IN | BODY MASS INDEX: 28.94 KG/M2

## 2020-12-23 DIAGNOSIS — S13.4XXD INJURY TO LIGAMENT OF CERVICAL SPINE, SUBSEQUENT ENCOUNTER: ICD-10-CM

## 2020-12-23 DIAGNOSIS — S32.810D CLOSED PELVIC RING FRACTURE WITH ROUTINE HEALING, SUBSEQUENT ENCOUNTER: ICD-10-CM

## 2020-12-23 DIAGNOSIS — Z98.1 HISTORY OF THORACIC SPINAL FUSION: ICD-10-CM

## 2020-12-23 DIAGNOSIS — R53.81 PHYSICAL DECONDITIONING: ICD-10-CM

## 2020-12-23 DIAGNOSIS — Z79.4 TYPE 2 DIABETES MELLITUS WITHOUT COMPLICATION, WITH LONG-TERM CURRENT USE OF INSULIN (H): ICD-10-CM

## 2020-12-23 DIAGNOSIS — S32.401D CLOSED NONDISPLACED FRACTURE OF RIGHT ACETABULUM WITH ROUTINE HEALING, UNSPECIFIED PORTION OF ACETABULUM, SUBSEQUENT ENCOUNTER: Primary | ICD-10-CM

## 2020-12-23 DIAGNOSIS — R13.10 DYSPHAGIA, UNSPECIFIED TYPE: ICD-10-CM

## 2020-12-23 DIAGNOSIS — S06.9X9S TRAUMATIC BRAIN INJURY WITH LOSS OF CONSCIOUSNESS, SEQUELA (H): ICD-10-CM

## 2020-12-23 DIAGNOSIS — I10 ESSENTIAL HYPERTENSION: ICD-10-CM

## 2020-12-23 DIAGNOSIS — S32.401D CLOSED DISPLACED FRACTURE OF RIGHT ACETABULUM WITH ROUTINE HEALING, UNSPECIFIED PORTION OF ACETABULUM, SUBSEQUENT ENCOUNTER: ICD-10-CM

## 2020-12-23 DIAGNOSIS — E11.9 TYPE 2 DIABETES MELLITUS WITHOUT COMPLICATION, WITH LONG-TERM CURRENT USE OF INSULIN (H): ICD-10-CM

## 2020-12-23 DIAGNOSIS — R33.9 URINE RETENTION: ICD-10-CM

## 2020-12-23 DIAGNOSIS — S22.009D: ICD-10-CM

## 2020-12-23 DIAGNOSIS — R29.898 BILATERAL LEG WEAKNESS: ICD-10-CM

## 2020-12-23 DIAGNOSIS — S32.810D MULTIPLE CLOSED FRACTURES OF PELVIS WITH STABLE DISRUPTION OF PELVIC RING WITH ROUTINE HEALING, SUBSEQUENT ENCOUNTER: ICD-10-CM

## 2020-12-23 DIAGNOSIS — S12.9XXD CLOSED FRACTURE OF CERVICAL VERTEBRA, UNSPECIFIED CERVICAL VERTEBRAL LEVEL, SUBSEQUENT ENCOUNTER: ICD-10-CM

## 2020-12-23 LAB
SARS-COV-2 RNA SPEC QL NAA+PROBE: NOT DETECTED
SPECIMEN SOURCE: NORMAL

## 2020-12-23 PROCEDURE — 99316 NF DSCHRG MGMT 30 MIN+: CPT | Performed by: NURSE PRACTITIONER

## 2020-12-23 ASSESSMENT — MIFFLIN-ST. JEOR: SCORE: 1691.71

## 2020-12-23 NOTE — LETTER
12/23/2020        RE: Zen Quiles  [address]  [place] [address]        [facility] DISCHARGE SUMMARY  PATIENT'S NAME: Zen Quiles  YOB: 1960  MEDICAL RECORD NUMBER:  [id number]  Place of Service where encounter took place:  [facility]  ([facility]) [[id number]]    PRIMARY CARE PROVIDER AND CLINIC RESPONSIBLE AFTER TRANSFER:   Lon Addison MD, [facility] [address] / [place] [address]    Non-[facility] Provider     Transferring providers: Belkys Ang, DREW ATKINS, Alina Renee MD  Recent Hospitalization/ED:  [facility] stay 9/14/2020 to 11/20/2020.  Date of SNF Admission: November / 20 / 2020  Date of SNF (anticipated) Discharge: December / 25 / 2020  Discharged to: previous independent home  Cognitive Scores: SLUMS 26/30  Physical Function: Minimal assist with upper and lower body dressing. Ambulating 110 feet with CGA with rolling walker  DME: Wheelchair    CODE STATUS/ADVANCE DIRECTIVES DISCUSSION:  Full Code   ALLERGIES: Patient has no known allergies.    DISCHARGE DIAGNOSIS/NURSING FACILITY COURSE:     60 y.o male with PMH DM 2, HTN, ADHD with complicated recent medical history including fall 35 feet 8/2020 with subsequent multiple traumatic injuries (see admit note for further details). Patient had surgery for acetabular fracture and unstable pelvic ring including multilevel spinal fusion. Was then admitted to [facility] 9/14/20 - 11/20/20. During this stay patient required a CT for an empyema. Patient was treated with antibiotics and improved. Was admitted to this TCU for acute rehab and medical management. Patient has remained medically stable and made functional gains in rehab. Discharge functional status is as above.     Issues addressed in TCU:    Closed nondisplaced fracture of right acetabulum with routine healing, unspecified portion of acetabulum, subsequent encounter  Multiple closed  fractures of pelvis with stable disruption of pelvic ring with routine healing, subsequent encounter  Bilateral LE weakness  - 2/2 fall in 8/2020. Was NWB, TTWB 4 months RLE. Now healing without issue. But bilateral LE weakness persists. Patient reports persistent pain to hands and feet, buttock at times. ? Critical illness myopathy.   - Did f/u with neurosurgery for repeat x-rays and EMG- EMG reveals some nerve injuries associated with injury.   - home PT/OT  - f/w neurosurgery as directed       Traumatic brain injury with loss of consciousness, sequela (H)  - h/o left temoral SAH and left frontotemporal parietal SDH  - some slow speech and dry/hoarse throat but oriented x 3 today.   - continues to work with OT/ST      History of thoracic spinal fusion  Closed fracture of C6&C7  Closed fracture of T10-T11  - after fall  Bilateral LE weakness- neurosurgery f/u and diagnostic tests as above  - pain has remained of issue. Oxycodone has been tapered in TCU. Taking TID usually before therapies now.   - continues on lidocaine patch, voltaren gel, increased Gabapentin HS dose to 600 mg  - continue scheduled acetaminophen and TID prn oxycodone  - Is to wear TLSO when out of bed. 20 lb lifting restriction  - f/w neurosurgery as directed  - w/c      Type 2 diabetes mellitus without complication, with long-term current use of insulin (H)  - controlled, BGL 100s  - continue on Lantus 20 units, metformin  - discontinued sliding scale insulin  - BGL ac and HS        Dysphagia, unspecified type  - working with ST  - Reg diet with NTL has been advanced to regular liquid consistency, aspiration precautions     Essential hypertension  - controlled  - continue on amlodipine, labetalol     Urine retention  - Nevarez catheter discontinued 12/21 for voiding trial. So far patient is voiding with low PVRS  - continue to monitor PVRs and voiding pattern     Physical deconditioning  - 2/2 above- discharge functional status as above  - home  PT/OT                 Past Medical History:  has no past medical history on file.    Discharge Medications:      Current Outpatient Medications   Medication Sig Dispense Refill     acetaminophen (TYLENOL) 500 MG tablet Take 1,000 mg by mouth 3 times daily        amLODIPine (NORVASC) 10 MG tablet Take 10 mg by mouth daily        diclofenac (VOLTAREN) 1 % topical gel Apply 2 g topically 4 times daily       Dimethicone-Zinc Oxide-Vit A-D (A & D ZINC OXIDE) CREA Apply to perineum topically as needed for skin breakdown       gabapentin (NEURONTIN) 300 MG capsule Take 400 mg by mouth 3 times daily       glucagon 1 MG kit Inject 1 mg into the muscle as needed for low blood sugar        glucose 40 % (400 mg/mL) gel Take 15 g by mouth every hour as needed for low blood sugar        insulin glargine (LANTUS VIAL) 100 UNIT/ML vial Inject 20 Units Subcutaneous daily       insulin lispro (HUMALOG VIAL) 100 UNIT/ML vial Inject Subcutaneous 2 times daily Inject as per  sliding scale: if 151 - 200 = 2 units; 201 - 250 = 4  units; 251 - 300 = 6 units; 301 - 350 = 8 units; 351 -  400 = 10 units; 401+ = 12 units and call medical  provider, subcutaneously two times a day for DM II       labetalol (NORMODYNE) 300 MG tablet Take 150 mg by mouth every 8 hours        Lidocaine (LIDOCARE) 4 % Patch Place 2 patches onto the skin every 24 hours To prevent lidocaine toxicity, patient should be patch free for 12 hrs daily. 2 patch 0     melatonin 3 MG tablet Take 3 mg by mouth At Bedtime        metFORMIN (GLUCOPHAGE) 1000 MG tablet Take 1,000 mg by mouth 2 times daily (with meals)        oxyCODONE (ROXICODONE) 5 MG tablet Take 1 tablet (5 mg) by mouth every 8 hours as needed for severe pain (based on pain scale) 30 tablet 0     polyethylene glycol (MIRALAX) 17 GM/Dose powder Take 17 g by mouth daily as needed        senna (SENOKOT) 8.6 MG tablet Take 17.2 mg by mouth At Bedtime        tamsulosin (FLOMAX) 0.4 MG capsule Take 0.4 mg by mouth  "daily          Medication Changes/Rationale:     As above    Controlled medications sent with patient:   Medication: oxycodone 5 mg , 15 tabs given to patient at the time of discharge to take home     ROS:   4 point ROS including Respiratory, CV, GI and , other than that noted in the HPI,  is negative  Persistent hand pain.    Physical Exam:   Vitals: /66   Pulse 83   Temp 98.2  F (36.8  C)   Resp 18   Ht 1.753 m (5' 9\")   Wt 88.6 kg (195 lb 6.4 oz)   SpO2 97%   BMI 28.86 kg/m    BMI= Body mass index is 28.86 kg/m .  Resp: Effort WNL  CV: VS as above- no edema  Abd- soft, nontender, BS +  Musc- MOORE  Psych- alert, calm, pleasant       SNF labs:   Most Recent 3 CBC's:  Recent Labs   Lab Test 11/25/20  1110   WBC 10.7   HGB 12.5*   MCV 84        Most Recent 3 BMP's:  Recent Labs   Lab Test 11/25/20  1110      POTASSIUM 4.1   CHLORIDE 103   CO2 25   BUN 15   CR 0.56*   ANIONGAP 8   MARIE 8.9   *         DISCHARGE PLAN:    Follow up labs: No labs orders/due    Medical Follow Up:      Follow up with primary care provider in 1 weeks  Follow up with specialist neurosurgeon    MTM referral needed and placed by this provider: No    Current [place] scheduled appointments:       Discharge Services: Home Care:  Occupational Therapy, Physical Therapy, Registered Nurse, Home Health Aide and From:  [facility]    Discharge Instructions Verbalized to Patient at Discharge:     None      TOTAL DISCHARGE TIME:   Greater than 30 minutes  Electronically signed by:  DREW Craig CNP     Home care Face to Face documentation done in [facility] attached to Home care orders for [facility].                         Sincerely,        DREW Craig CNP    "

## 2020-12-23 NOTE — PROGRESS NOTES
[facility] DISCHARGE SUMMARY  PATIENT'S NAME: Zen Quiles  YOB: 1960  MEDICAL RECORD NUMBER:  [id number]  Place of Service where encounter took place:  [facility]  (Critical access hospital) [[id number]]    PRIMARY CARE PROVIDER AND CLINIC RESPONSIBLE AFTER TRANSFER:   Lon Addison MD, [facility] [address] / [place] [address]    Non-FMG Provider     Transferring providers: DREW Craig CNP, Alina Renee MD  Recent Hospitalization/ED:  [facility] stay 9/14/2020 to 11/20/2020.  Date of SNF Admission: November / 20 / 2020  Date of SNF (anticipated) Discharge: December / 25 / 2020  Discharged to: previous independent home  Cognitive Scores: SLUMS 26/30  Physical Function: Minimal assist with upper and lower body dressing. Ambulating 110 feet with CGA with rolling walker  DME: Wheelchair    CODE STATUS/ADVANCE DIRECTIVES DISCUSSION:  Full Code   ALLERGIES: Patient has no known allergies.    DISCHARGE DIAGNOSIS/NURSING FACILITY COURSE:     60 y.o male with PMH DM 2, HTN, ADHD with complicated recent medical history including fall 35 feet 8/2020 with subsequent multiple traumatic injuries (see admit note for further details). Patient had surgery for acetabular fracture and unstable pelvic ring including multilevel spinal fusion. Was then admitted to [facility] 9/14/20 - 11/20/20. During this stay patient required a CT for an empyema. Patient was treated with antibiotics and improved. Was admitted to this TCU for acute rehab and medical management. Patient has remained medically stable and made functional gains in rehab. Discharge functional status is as above.     Issues addressed in TCU:    Closed nondisplaced fracture of right acetabulum with routine healing, unspecified portion of acetabulum, subsequent encounter  Multiple closed fractures of pelvis with stable disruption of pelvic ring with routine healing,  subsequent encounter  Bilateral LE weakness  - 2/2 fall in 8/2020. Was NWB, TTWB 4 months RLE. Now healing without issue. But bilateral LE weakness persists. Patient reports persistent pain to hands and feet, buttock at times. ? Critical illness myopathy.   - Did f/u with neurosurgery for repeat x-rays and EMG- EMG reveals some nerve injuries associated with injury.   - home PT/OT  - f/w neurosurgery as directed       Traumatic brain injury with loss of consciousness, sequela (H)  - h/o left temoral SAH and left frontotemporal parietal SDH  - some slow speech and dry/hoarse throat but oriented x 3 today.   - continues to work with OT/ST      History of thoracic spinal fusion  Closed fracture of C6&C7  Closed fracture of T10-T11  - after fall  Bilateral LE weakness- neurosurgery f/u and diagnostic tests as above  - pain has remained of issue. Oxycodone has been tapered in TCU. Taking TID usually before therapies now.   - continues on lidocaine patch, voltaren gel, increased Gabapentin HS dose to 600 mg  - continue scheduled acetaminophen and TID prn oxycodone  - Is to wear TLSO when out of bed. 20 lb lifting restriction  - f/w neurosurgery as directed  - w/c      Type 2 diabetes mellitus without complication, with long-term current use of insulin (H)  - controlled, BGL 100s  - continue on Lantus 20 units, metformin  - discontinued sliding scale insulin  - BGL ac and HS        Dysphagia, unspecified type  - working with ST  - Reg diet with NTL has been advanced to regular liquid consistency, aspiration precautions     Essential hypertension  - controlled  - continue on amlodipine, labetalol     Urine retention  - Nevarez catheter discontinued 12/21 for voiding trial. So far patient is voiding with low PVRS  - continue to monitor PVRs and voiding pattern     Physical deconditioning  - 2/2 above- discharge functional status as above  - home PT/OT                 Past Medical History:  has no past medical history on  file.    Discharge Medications:      Current Outpatient Medications   Medication Sig Dispense Refill     acetaminophen (TYLENOL) 500 MG tablet Take 1,000 mg by mouth 3 times daily        amLODIPine (NORVASC) 10 MG tablet Take 10 mg by mouth daily        diclofenac (VOLTAREN) 1 % topical gel Apply 2 g topically 4 times daily       Dimethicone-Zinc Oxide-Vit A-D (A & D ZINC OXIDE) CREA Apply to perineum topically as needed for skin breakdown       gabapentin (NEURONTIN) 300 MG capsule Take 400 mg by mouth 3 times daily       glucagon 1 MG kit Inject 1 mg into the muscle as needed for low blood sugar        glucose 40 % (400 mg/mL) gel Take 15 g by mouth every hour as needed for low blood sugar        insulin glargine (LANTUS VIAL) 100 UNIT/ML vial Inject 20 Units Subcutaneous daily       insulin lispro (HUMALOG VIAL) 100 UNIT/ML vial Inject Subcutaneous 2 times daily Inject as per  sliding scale: if 151 - 200 = 2 units; 201 - 250 = 4  units; 251 - 300 = 6 units; 301 - 350 = 8 units; 351 -  400 = 10 units; 401+ = 12 units and call medical  provider, subcutaneously two times a day for DM II       labetalol (NORMODYNE) 300 MG tablet Take 150 mg by mouth every 8 hours        Lidocaine (LIDOCARE) 4 % Patch Place 2 patches onto the skin every 24 hours To prevent lidocaine toxicity, patient should be patch free for 12 hrs daily. 2 patch 0     melatonin 3 MG tablet Take 3 mg by mouth At Bedtime        metFORMIN (GLUCOPHAGE) 1000 MG tablet Take 1,000 mg by mouth 2 times daily (with meals)        oxyCODONE (ROXICODONE) 5 MG tablet Take 1 tablet (5 mg) by mouth every 8 hours as needed for severe pain (based on pain scale) 30 tablet 0     polyethylene glycol (MIRALAX) 17 GM/Dose powder Take 17 g by mouth daily as needed        senna (SENOKOT) 8.6 MG tablet Take 17.2 mg by mouth At Bedtime        tamsulosin (FLOMAX) 0.4 MG capsule Take 0.4 mg by mouth daily          Medication Changes/Rationale:     As above    Controlled  "medications sent with patient:   Medication: oxycodone 5 mg , 15 tabs given to patient at the time of discharge to take home     ROS:   4 point ROS including Respiratory, CV, GI and , other than that noted in the HPI,  is negative  Persistent hand pain.    Physical Exam:   Vitals: /66   Pulse 83   Temp 98.2  F (36.8  C)   Resp 18   Ht 1.753 m (5' 9\")   Wt 88.6 kg (195 lb 6.4 oz)   SpO2 97%   BMI 28.86 kg/m    BMI= Body mass index is 28.86 kg/m .  Resp: Effort WNL  CV: VS as above- no edema  Abd- soft, nontender, BS +  Musc- MOORE  Psych- alert, calm, pleasant       SNF labs:   Most Recent 3 CBC's:  Recent Labs   Lab Test 11/25/20  1110   WBC 10.7   HGB 12.5*   MCV 84        Most Recent 3 BMP's:  Recent Labs   Lab Test 11/25/20  1110      POTASSIUM 4.1   CHLORIDE 103   CO2 25   BUN 15   CR 0.56*   ANIONGAP 8   MARIE 8.9   *         DISCHARGE PLAN:    Follow up labs: No labs orders/due    Medical Follow Up:      Follow up with primary care provider in 1 weeks  Follow up with specialist neurosurgeon    MTM referral needed and placed by this provider: No    Current [facility] scheduled appointments:       Discharge Services: Home Care:  Occupational Therapy, Physical Therapy, Registered Nurse, Home Health Aide and From:  [facility]    Discharge Instructions Verbalized to Patient at Discharge:     None      TOTAL DISCHARGE TIME:   Greater than 30 minutes  Electronically signed by:  DREW Craig CNP     Home care Face to Face documentation done in [facility] attached to Home care orders for [facility].                   "

## 2020-12-24 RX ORDER — GABAPENTIN 400 MG/1
400 CAPSULE ORAL 2 TIMES DAILY
Start: 2020-12-24

## 2020-12-24 RX ORDER — GABAPENTIN 600 MG/1
600 TABLET ORAL AT BEDTIME
Start: 2020-12-24

## 2020-12-24 RX ORDER — OXYCODONE HYDROCHLORIDE 5 MG/1
5 TABLET ORAL 3 TIMES DAILY PRN
Qty: 30 TABLET | Refills: 0
Start: 2020-12-24 | End: 2020-12-30

## 2020-12-24 NOTE — PATIENT INSTRUCTIONS
Zen Quiles  YOB: 1960                                 SSN: [id number]  [facility] MRN#:[id number]  Medical Center Admission Date: 11/20/2020 Discharge Date: 12/24/2020   PHYSICIAN's DISCHARGE SUMMARY / ORDER SHEET  1. Discharge to: Home  2. Medications:      Current Outpatient Medications   Medication Sig     gabapentin (NEURONTIN) 400 MG capsule Take 1 capsule (400 mg) by mouth 2 times daily     gabapentin (NEURONTIN) 600 MG tablet Take 1 tablet (600 mg) by mouth At Bedtime     oxyCODONE (ROXICODONE) 5 MG tablet Take 1 tablet (5 mg) by mouth 3 times daily as needed for severe pain (based on pain scale)     acetaminophen (TYLENOL) 500 MG tablet Take 1,000 mg by mouth 3 times daily      amLODIPine (NORVASC) 10 MG tablet Take 10 mg by mouth daily      diclofenac (VOLTAREN) 1 % topical gel Apply 2 g topically 4 times daily     Dimethicone-Zinc Oxide-Vit A-D (A & D ZINC OXIDE) CREA Apply to perineum topically as needed for skin breakdown     glucagon 1 MG kit Inject 1 mg into the muscle as needed for low blood sugar      glucose 40 % (400 mg/mL) gel Take 15 g by mouth every hour as needed for low blood sugar      insulin glargine (LANTUS VIAL) 100 UNIT/ML vial Inject 20 Units Subcutaneous daily     labetalol (NORMODYNE) 300 MG tablet Take 150 mg by mouth every 8 hours      Lidocaine (LIDOCARE) 4 % Patch Place 2 patches onto the skin every 24 hours To prevent lidocaine toxicity, patient should be patch free for 12 hrs daily.     melatonin 3 MG tablet Take 3 mg by mouth At Bedtime      metFORMIN (GLUCOPHAGE) 1000 MG tablet Take 1,000 mg by mouth 2 times daily (with meals)      polyethylene glycol (MIRALAX) 17 GM/Dose powder Take 17 g by mouth daily as needed      senna (SENOKOT) 8.6 MG tablet Take 17.2 mg by mouth At Bedtime      tamsulosin (FLOMAX) 0.4 MG capsule Take 0.4 mg by mouth daily        --see Home Medication List/Physician Order    3. Diet: regular with regular consistency liquids    4. Condition on  Discharge: Stable    5. Level of Activity: Up with assistance, up in w/c.                                  Do not lift > 20 lbs     6. Appointment:  Follow up with primary MD within 7 days from discharge.      7. Referrals: Home Care RN, PT, OT, HHA- [facility]     8. Other orders/comments:      Discharge patient to home with current medications and treatments  Discharge Home with Home care as above  - Nursing call in 30 days supply of needed meds to pharmacy of choice upon discharge  - please send home with original of these discharge instructions and copy for chart.   Send home with # 20 Oxycodone 5 mg tablets      ______________________________  DREW Craig [facility]                   12/24/2020

## 2020-12-30 DIAGNOSIS — S32.810D CLOSED PELVIC RING FRACTURE WITH ROUTINE HEALING, SUBSEQUENT ENCOUNTER: ICD-10-CM

## 2020-12-30 DIAGNOSIS — S32.401D CLOSED DISPLACED FRACTURE OF RIGHT ACETABULUM WITH ROUTINE HEALING, UNSPECIFIED PORTION OF ACETABULUM, SUBSEQUENT ENCOUNTER: ICD-10-CM

## 2020-12-30 DIAGNOSIS — S12.9XXD CLOSED FRACTURE OF CERVICAL VERTEBRA, UNSPECIFIED CERVICAL VERTEBRAL LEVEL, SUBSEQUENT ENCOUNTER: ICD-10-CM

## 2020-12-30 DIAGNOSIS — S22.009D: ICD-10-CM

## 2020-12-30 DIAGNOSIS — S13.4XXD INJURY TO LIGAMENT OF CERVICAL SPINE, SUBSEQUENT ENCOUNTER: ICD-10-CM

## 2020-12-30 RX ORDER — OXYCODONE HYDROCHLORIDE 5 MG/1
5 TABLET ORAL 3 TIMES DAILY PRN
Qty: 7 TABLET | Refills: 0 | Status: SHIPPED | OUTPATIENT
Start: 2020-12-30

## 2022-07-07 ENCOUNTER — OFFICE VISIT (OUTPATIENT)
Dept: PODIATRY | Facility: CLINIC | Age: 62
End: 2022-07-07
Payer: COMMERCIAL

## 2022-07-07 VITALS
DIASTOLIC BLOOD PRESSURE: 62 MMHG | BODY MASS INDEX: 30.36 KG/M2 | WEIGHT: 205 LBS | SYSTOLIC BLOOD PRESSURE: 106 MMHG | HEIGHT: 69 IN

## 2022-07-07 DIAGNOSIS — M21.371 FOOT DROP, RIGHT: ICD-10-CM

## 2022-07-07 DIAGNOSIS — E11.42 DIABETIC POLYNEUROPATHY ASSOCIATED WITH TYPE 2 DIABETES MELLITUS (H): Primary | ICD-10-CM

## 2022-07-07 DIAGNOSIS — L84 PRE-ULCERATIVE CALLUSES: ICD-10-CM

## 2022-07-07 DIAGNOSIS — R60.0 EDEMA OF BOTH LOWER EXTREMITIES: ICD-10-CM

## 2022-07-07 PROCEDURE — 99203 OFFICE O/P NEW LOW 30 MIN: CPT | Performed by: PODIATRIST

## 2022-07-07 RX ORDER — UREA 200 MG/G
CREAM TOPICAL DAILY
Qty: 85 G | Refills: 6 | Status: SHIPPED | OUTPATIENT
Start: 2022-07-07

## 2022-07-07 RX ORDER — DEXTROAMPHETAMINE SULFATE, DEXTROAMPHETAMINE SACCHARATE, AMPHETAMINE SULFATE AND AMPHETAMINE ASPARTATE 7.5; 7.5; 7.5; 7.5 MG/1; MG/1; MG/1; MG/1
CAPSULE, EXTENDED RELEASE ORAL
COMMUNITY
Start: 2022-05-22

## 2022-07-07 RX ORDER — TRAZODONE HYDROCHLORIDE 100 MG/1
100 TABLET ORAL
COMMUNITY
Start: 2022-07-06

## 2022-07-07 NOTE — PATIENT INSTRUCTIONS
"Thank you for choosing [facility] Podiatry / Foot & Ankle Surgery!    DR SANCHEZ'S CLINIC:  [facility]   [address]   [place] [address]      TRIAGE LINE: [phone]  APPOINTMENTS: [phone]  RADIOLOGY: [phone]  SET UP SURGERY: [phone]  FAX NUMBER: [phone]  BILLING QUESTIONS: [phone]       Follow up: As needed      DIABETES AND YOUR FEET  Diabetes can result in several problems in the feet including ulcers (open sores) and amputations. Two of the most important reasons why people develop foot problems when they have diabetes is : 1. Neuropathy (loss of feeling)  2. Vascular disease (loss or decrease of blood flow).    Neuropathy is a term used to describe a loss of nerve function.  Patients with diabetes are at risk of developing neuropathy if their sugars continue to run high and are above the normal value. One theory for neuropathy is that the \"extra\" sugar in the body enters the nerves and is broken down. These by-products build up in the nerve causing it to swell and impairing nerve function. Often times, this can be prevented by controlling your sugars, dieting and exercise.    When a person develops neuropathy, they usually begin to feel numbness or tingling in their feet and sometime in their legs.  Other symptoms may include painful burning or hot feet, tingling or feeling like insects or ants are crawling on your feet or legs.  If the diabetes is sever and the sugars run high for long periods of time, neuropathy can also occur in the hands.    Vascular disease  is a term used to describe a loss or decrease in circulation (blood flow). There is a problem in getting blood and oxygen to areas that need it. Similar to neuropathy, sugars can build up in the walls of the arteries (blood vessels) and cause them to become swollen, thickened and hardened. This decreases the amount of blood that can go to an area that needs it. Though this is common in the " legs of diabetic patients, it can also affect other arteries (blood vessels) in the body such as in the heart and eyes.    In the legs, vascular disease usually results in cramping. Patients who develop leg cramps after walking the same distance every time (i.e. One block, half a mile, ect.) need to let their doctors know so that their circulation may be checked. Cramps causing severe pain in the feet and/or legs while sleeping and the cramps go away when you stand or hang your legs off the side of the bed, may also be a sign of poor blood circulation.  Occasional cramping in cold weather or on rare occasions with activity may not be due to poor circulation, but you should inform your doctor.    PREVENTION OF THESE DISEASES  The key to prevention is good blood sugar control. Poor blood sugar control is a big reason many of these problems start. Physical activity (exercise) is a very good way to help decrease your blood sugars. Exercise can lower your blood sugar, blood pressure, and cholesterol. It also reduces your risk for heart disease and stroke, relieves stress, and strengthens your heart, muscles and bones.  In addition, regular activity helps insulin work better, improves your blood circulation, and keeps your joints flexible. If you're trying to lose weight, a combination of exercise and wise food choices can help you reach your target weight and maintain it.      PAIN MANAGEMENT (**Please speak with your primary doctor about any medications**)  1.Blood Sugar Control - Most important  2. Medications such as:  Amytriptylline, duloxetine, gabapentin, lyrica, tramadol (talk with your primary care doctor about this).     NUTRITION:  Nutrition is also important to help with healing. If your body does not have what it needs, it can't heal.   Increasing your protein intake is important.  With wounds you need 60-90gm of protein a day to help with healing. Over the counter protein shakes such as Tee, Glucerna,  "Ensure, ect... can help to supplement your daily protein intake.   It is also important to take Vitamins to help with healing.  Vitamins such as B12, B6 and Vitamin D3 are important for healing. These can be gotten over the counter at pharmacies or at stores like [employer] or the [employer].    I can also prescribe a dietary supplement called \"Rheumate\" that has a lot of essential vitamins in one capsule.  This may not be covered by insurance though.     FOOT CARE RECOMMENDATIONS   1. Wash your feet with lukewarm water and a mild soap and then dry them thoroughly, especially between the toes.     2. Examine your feet daily looking for cuts, corns, blisters, cracks, ect, especially after wearing new shoes. Make sure to look between your toes. If you cannot see the bottom of your feet, set a mirror on the floor and hold your foot over it, or ask a spouse, friend or family member to examine your feet for you. Contact your doctor immediately if new problems are noted or if sores are not healing.     3. Immediately apply moisturizer to the tops and bottoms of your feet, avoiding areas between the toes. Hand lotion (Intesive Care, Amanda, Eucerin, Neutrogena, Curel, ect) is sufficient unless your doctor prescribes a medicated lotion. Apply sunscreen to your feet when going swimming outside.     4. Use clean comfortable shoes, wear white socks (if you have any bleeding or drainage, you will see it on white socks). Socks should not have thick seams or cut off the circulation around the leg. Break in new shoes slowly and rotate with older shoes until broken in. Check the inside of your shoes with your hand to look for areas of irritation or objects that may have fallen into your shoes.       5. Keep slippers by the side of your bed for use during the night.     6.  Shoes should be fitted by a professional and should not cause areas of irritation.  Check your feet regularly when wearing a new pair of shoes and replace them as " needed.     7.  Talk to your doctor about proper exercise. Exercise and stretching stimulate blood flow to your feet and maintain proper glucose levels.     8.  Monitor your blood glucose level as instructed by your doctor. Notify your doctor immediately if your blood sugar is abnormally high or low.    9. Cut your nails straight across, but then gently round any sharp edges with a cardboard nail file. If you have neuropathy, peripheral vascular disease or cannot see that well to trim your own toenails contact [employer] ([phone]) or [employer] ([phone]).      THINGS TO AVOID DOING   1.  Do not soak your feet if you have an open sore. Use only lukewarm water and always check the temperature with your hand as hot water can easily burn your feet.       2.  Never use a hot water bottle or heating pad on your feet. Also do not apply cold compresses to your feet. With decreased sensation, you could burn or freeze your feet.       3.  Do not apply any of these to your feet:    -  Over the counter medicine for corns or warts    -  Harsh chemicals like boric acid    -  Do not self-treat corns, cuts, blisters or infections. Always consult your doctor.       4.  Do not wear sandals, slippers or walk barefoot, especially on hot sand or concrete or other harsh surfaces.     5.  If you smoke, stop!!!     CALLUS / CORNS / IPKs  When there is excessive friction or pressure on the skin, the body responds by making the skin thicker to protect the deeper structures from becoming exposed. While this works well to protect the deeper structures, the thickened skin can increase pressure and pain.    CALLUS: Flat, diffuse thickening are simple calluses and they are usually caused by friction. Often these are the result of rubbing on a shoe or going barefoot.    CORNS: Calluses with a central core between the toes are called corns. These result from prominent joints on adjacent toes rubbing together. Theses are a symptom of  bone malalignment and will always recur unless the underlying bones are addressed surgically.    IPKs: Calluses with a central core on the ball of the foot are usually IPKs (intractable plantar keratosis). These are caused by excessive pressure from the metatarsals, the bones that make up the ball of the foot. Often one of these bones is too long or too prominent.  Again, these will always recur unless the underlying bone issue is addressed. There is no cure for these. They will either go away by themselves, recur, or more could develop.    ROUTINE MAINTENANCE  1. File them down with a pumice stone or callus file a couple times a week.   2. An electric callus removing device. Amope Pedi Perfect Electronic Pedicure Foot File and Callus Remover can be a good option.   3. Lotion can be applied to soften the callus. A urea based cream such as Kersal or Vanicream or thicker cream with shea butter are good options.  4. Toe spacers or toe covers can be used for corns, gel pads can be used for other lesions on the bottom of the foot.   If there is a surgical pathology noted, such as a prominent bone, often this needs to be addressed surgically to minimize recurrence. However, sometimes the lesion simply migrates to another spot after surgery, so it is not a guaranteed cure.     **If you come back to clinic for treatment, insurance does not cover it, and you would be billed. This charge could range from $100 - $227** [facility]  [place]  [address] [place] [address] [address]   [place] [address]  Ph: [phone]  Fax: [phone] [facility]  [address]  [place] [address]  Ph: [phone]  Fax: [phone]   [place]  [address]   [place] [address]  Ph: [phone]  Fax: [phone] [place]  [address]  [place] [address]   Ph: [phone]    [place]  [address]  [place] [address]   Ph: [phone]  [place]  [address]  [place]  [address]   Ph: [phone]      [place]  [address]  [place] [address]  Ph: [phone]  [place]  [address]   [place] [address]  Ph: [phone]  Fax: [phone]

## 2022-07-07 NOTE — PROGRESS NOTES
PATIENT HISTORY:   Zen Quiles is a 61 year old male who presents to clinic for nonhealing wound to the right great toe.  Patient notes that about 3 weeks ago he noticed it because he saw blood on the floor.  He has numbness to the feet and so he does not know if he hurts his foot.  He also notes that he has not been wearing his AFO for the last month because it summer.  Denies fever, nausea, vomiting.  Notes that it has not drained anything for the last week.  Here with a family member.  Wondering what can be done to heal the wound.    Review of Systems:  Patient denies fever, chills, rash, wound, stiffness, limping,  weakness, heart burn, blood in stool, chest pain with activity, calf pain when walking, shortness of breath with activity, chronic cough, easy bleeding/bruising, excessive thirst, fatigue, depression, anxiety.  Patient admits to numbness, swelling.     PAST MEDICAL HISTORY: Problems  Problem Noted Date   Traumatic brain injury 01/04/2021   Umbilical hernia without obstruction and without gangrene 08/26/2019   Pyoderma 08/26/2019   Seborrheic dermatitis of scalp 08/26/2019   Erectile dysfunction 03/29/2016   Controlled substance agreement signed 01/07/2016   Major depressive disorder, recurrent episode, unspecified 02/21/2010   Diabetes mellitus, type 2 11/25/2009   Depression, recurrent     ADD (attention deficit disorder with hyperactivity)         PAST SURGICAL HISTORY: No past surgical history on file.     MEDICATIONS:   Current Outpatient Medications:      acetaminophen (TYLENOL) 500 MG tablet, Take 1,000 mg by mouth 3 times daily , Disp: , Rfl:      amLODIPine (NORVASC) 10 MG tablet, Take 10 mg by mouth daily , Disp: , Rfl:      diclofenac (VOLTAREN) 1 % topical gel, Apply 2 g topically 4 times daily, Disp: , Rfl:      Dimethicone-Zinc Oxide-Vit A-D (A & D ZINC OXIDE) CREA, Apply to perineum topically as needed for skin breakdown, Disp: , Rfl:      gabapentin (NEURONTIN) 400 MG capsule, Take 1  capsule (400 mg) by mouth 2 times daily, Disp: , Rfl:      gabapentin (NEURONTIN) 600 MG tablet, Take 1 tablet (600 mg) by mouth At Bedtime, Disp: , Rfl:      glucagon 1 MG kit, Inject 1 mg into the muscle as needed for low blood sugar , Disp: , Rfl:      glucose 40 % (400 mg/mL) gel, Take 15 g by mouth every hour as needed for low blood sugar , Disp: , Rfl:      insulin glargine (LANTUS VIAL) 100 UNIT/ML vial, Inject 20 Units Subcutaneous daily, Disp: , Rfl:      labetalol (NORMODYNE) 300 MG tablet, Take 150 mg by mouth every 8 hours , Disp: , Rfl:      Lidocaine (LIDOCARE) 4 % Patch, Place 2 patches onto the skin every 24 hours To prevent lidocaine toxicity, patient should be patch free for 12 hrs daily., Disp: 2 patch, Rfl: 0     melatonin 3 MG tablet, Take 3 mg by mouth At Bedtime , Disp: , Rfl:      metFORMIN (GLUCOPHAGE) 1000 MG tablet, Take 1,000 mg by mouth 2 times daily (with meals) , Disp: , Rfl:      oxyCODONE (ROXICODONE) 5 MG tablet, Take 1 tablet (5 mg) by mouth 3 times daily as needed for severe pain (based on pain scale), Disp: 7 tablet, Rfl: 0     polyethylene glycol (MIRALAX) 17 GM/Dose powder, Take 17 g by mouth daily as needed , Disp: , Rfl:      senna (SENOKOT) 8.6 MG tablet, Take 17.2 mg by mouth At Bedtime , Disp: , Rfl:      tamsulosin (FLOMAX) 0.4 MG capsule, Take 0.4 mg by mouth daily , Disp: , Rfl:      ALLERGIES:  No Known Allergies     SOCIAL HISTORY:   Social History     Socioeconomic History     Marital status:      Spouse name: Not on file     Number of children: Not on file     Years of education: Not on file     Highest education level: Not on file   Occupational History     Not on file   Tobacco Use     Smoking status: Not on file     Smokeless tobacco: Not on file   Substance and Sexual Activity     Alcohol use: Not on file     Drug use: Not on file     Sexual activity: Not on file   Other Topics Concern     Not on file   Social History Narrative     Not on file     Social  "Determinants of Health     Financial Resource Strain: Not on file   Food Insecurity: Not on file   Transportation Needs: Not on file   Physical Activity: Not on file   Stress: Not on file   Social Connections: Not on file   Intimate Partner Violence: Not on file   Housing Stability: Not on file        FAMILY HISTORY: No family history on file.     EXAM:Vitals: /62   Ht 1.753 m (5' 9\")   Wt 93 kg (205 lb)   BMI 30.27 kg/m      A1C: 7.3 (3/2022)    General appearance: Patient is alert and fully cooperative with history & exam.  No sign of distress is noted during the visit.     Psychiatric: Affect is pleasant & appropriate.  Patient appears motivated to improve health.     Respiratory: Breathing is regular & unlabored while sitting.     HEENT: Hearing is intact to spoken word.  Speech is clear.  No gross evidence of visual impairment that would impact ambulation.     Dermatologic:pre ulcerative hyperkeratotic lesion to plantar right 1st interphalangeal joint.  No open lesions on debridement.  No signs of infection.     Vascular: DP & PT pulses are intact & regular bilaterally.  +2 pitting edema to both legs and varicosities noted.  CFT and skin temperature is normal to both lower extremities.     Neurologic: Lower extremity sensation is absent to feet.     Musculoskeletal: Patient is ambulatory without assistive device today but notes that he does have an AFO for his right foot due to his dropfoot he just has not been wearing it for the last month.     ASSESSMENT:    Diabetic polyneuropathy associated with type 2 diabetes mellitus (H)  Pre-ulcerative calluses  Edema of both lower extremities  Foot drop, right     Medical Decision Making/Plan:  Reviewed patient[facility] chart in [facility].  Reviewed and discussed proper diabetic foot care with patient.  Discussed that it is very important to wear his AFO at all times to help decrease pressure off of the big toe.  Also wearing the shoes to help protect the foot as he has " neuropathy.  Discussed that going barefoot or in socks or without his AFO can cause increased pressure to the area which can lead to calluses and ulcers.    Discussed causes of keratomas.  They are due to areas of increase friction.  Hammertoes can create these as they put more pressure to the metatarsal head.  Discussed treatments such as using foot file, pumice stone, metatarsal pads, orthotics, and not walking barefoot.     We discussed the cost structure of callus care if they were to come back and have it treated in the clinic if insurance does not cover it and explained that they would be billed. They were also provided information on places to get the callus treatment.    Currently there is no ulcer at this time.  His insert was modified with felted foam to keep pressure off of this area and again recommended to wear at all times.  Recommended that he use a pumice stone or foot file to this area once to twice a week in the bath or shower to keep the callus from building up.  Was also given an order for urea cream to apply to the feet daily to help soften this area.  He was given an order for compression socks to help with swelling.  All questions were answered to patient satisfaction and he will call further questions or concerns.    Patient risk factor: Patient is at medium risk for infection.        Mckenzie Spaulding DPM, Podiatry/Foot and Ankle Surgery

## 2022-07-07 NOTE — LETTER
7/7/2022         RE: Zen Quiles  [id number] [address] [facility] [id number]        Dear Colleague,    Thank you for referring your patient, Zen Quiles, to the [facility]. Please see a copy of my visit note below.    PATIENT HISTORY:   Zen Quiles is a 61 year old male who presents to clinic for nonhealing wound to the right great toe.  Patient notes that about 3 weeks ago he noticed it because he saw blood on the floor.  He has numbness to the feet and so he does not know if he hurts his foot.  He also notes that he has not been wearing his AFO for the last month because it summer.  Denies fever, nausea, vomiting.  Notes that it has not drained anything for the last week.  Here with a family member.  Wondering what can be done to heal the wound.    Review of Systems:  Patient denies fever, chills, rash, wound, stiffness, limping,  weakness, heart burn, blood in stool, chest pain with activity, calf pain when walking, shortness of breath with activity, chronic cough, easy bleeding/bruising, excessive thirst, fatigue, depression, anxiety.  Patient admits to numbness, swelling.     PAST MEDICAL HISTORY: Problems  Problem Noted Date   Traumatic brain injury 01/04/2021   Umbilical hernia without obstruction and without gangrene 08/26/2019   Pyoderma 08/26/2019   Seborrheic dermatitis of scalp 08/26/2019   Erectile dysfunction 03/29/2016   Controlled substance agreement signed 01/07/2016   Major depressive disorder, recurrent episode, unspecified 02/21/2010   Diabetes mellitus, type 2 11/25/2009   Depression, recurrent     ADD (attention deficit disorder with hyperactivity)         PAST SURGICAL HISTORY: No past surgical history on file.     MEDICATIONS:   Current Outpatient Medications:      acetaminophen (TYLENOL) 500 MG tablet, Take 1,000 mg by mouth 3 times daily , Disp: , Rfl:      amLODIPine (NORVASC) 10 MG tablet, Take 10 mg by mouth daily , Disp: , Rfl:      diclofenac  (VOLTAREN) 1 % topical gel, Apply 2 g topically 4 times daily, Disp: , Rfl:      Dimethicone-Zinc Oxide-Vit A-D (A & D ZINC OXIDE) CREA, Apply to perineum topically as needed for skin breakdown, Disp: , Rfl:      gabapentin (NEURONTIN) 400 MG capsule, Take 1 capsule (400 mg) by mouth 2 times daily, Disp: , Rfl:      gabapentin (NEURONTIN) 600 MG tablet, Take 1 tablet (600 mg) by mouth At Bedtime, Disp: , Rfl:      glucagon 1 MG kit, Inject 1 mg into the muscle as needed for low blood sugar , Disp: , Rfl:      glucose 40 % (400 mg/mL) gel, Take 15 g by mouth every hour as needed for low blood sugar , Disp: , Rfl:      insulin glargine (LANTUS VIAL) 100 UNIT/ML vial, Inject 20 Units Subcutaneous daily, Disp: , Rfl:      labetalol (NORMODYNE) 300 MG tablet, Take 150 mg by mouth every 8 hours , Disp: , Rfl:      Lidocaine (LIDOCARE) 4 % Patch, Place 2 patches onto the skin every 24 hours To prevent lidocaine toxicity, patient should be patch free for 12 hrs daily., Disp: 2 patch, Rfl: 0     melatonin 3 MG tablet, Take 3 mg by mouth At Bedtime , Disp: , Rfl:      metFORMIN (GLUCOPHAGE) 1000 MG tablet, Take 1,000 mg by mouth 2 times daily (with meals) , Disp: , Rfl:      oxyCODONE (ROXICODONE) 5 MG tablet, Take 1 tablet (5 mg) by mouth 3 times daily as needed for severe pain (based on pain scale), Disp: 7 tablet, Rfl: 0     polyethylene glycol (MIRALAX) 17 GM/Dose powder, Take 17 g by mouth daily as needed , Disp: , Rfl:      senna (SENOKOT) 8.6 MG tablet, Take 17.2 mg by mouth At Bedtime , Disp: , Rfl:      tamsulosin (FLOMAX) 0.4 MG capsule, Take 0.4 mg by mouth daily , Disp: , Rfl:      ALLERGIES:  No Known Allergies     SOCIAL HISTORY:   Social History     Socioeconomic History     Marital status:      Spouse name: Not on file     Number of children: Not on file     Years of education: Not on file     Highest education level: Not on file   Occupational History     Not on file   Tobacco Use     Smoking status:  "Not on file     Smokeless tobacco: Not on file   Substance and Sexual Activity     Alcohol use: Not on file     Drug use: Not on file     Sexual activity: Not on file   Other Topics Concern     Not on file   Social History Narrative     Not on file     Social Determinants of Health     Financial Resource Strain: Not on file   Food Insecurity: Not on file   Transportation Needs: Not on file   Physical Activity: Not on file   Stress: Not on file   Social Connections: Not on file   Intimate Partner Violence: Not on file   Housing Stability: Not on file        FAMILY HISTORY: No family history on file.     EXAM:Vitals: /62   Ht 1.753 m (5' 9\")   Wt 93 kg (205 lb)   BMI 30.27 kg/m      A1C: 7.3 (3/2022)    General appearance: Patient is alert and fully cooperative with history & exam.  No sign of distress is noted during the visit.     Psychiatric: Affect is pleasant & appropriate.  Patient appears motivated to improve health.     Respiratory: Breathing is regular & unlabored while sitting.     HEENT: Hearing is intact to spoken word.  Speech is clear.  No gross evidence of visual impairment that would impact ambulation.     Dermatologic:pre ulcerative hyperkeratotic lesion to plantar right 1st interphalangeal joint.  No open lesions on debridement.  No signs of infection.     Vascular: DP & PT pulses are intact & regular bilaterally.  +2 pitting edema to both legs and varicosities noted.  CFT and skin temperature is normal to both lower extremities.     Neurologic: Lower extremity sensation is absent to feet.     Musculoskeletal: Patient is ambulatory without assistive device today but notes that he does have an AFO for his right foot due to his dropfoot he just has not been wearing it for the last month.     ASSESSMENT:    Diabetic polyneuropathy associated with type 2 diabetes mellitus (H)  Pre-ulcerative calluses  Edema of both lower extremities  Foot drop, right     Medical Decision Making/Plan:  Reviewed " patient's chart in epic.  Reviewed and discussed proper diabetic foot care with patient.  Discussed that it is very important to wear his AFO at all times to help decrease pressure off of the big toe.  Also wearing the shoes to help protect the foot as he has neuropathy.  Discussed that going barefoot or in socks or without his AFO can cause increased pressure to the area which can lead to calluses and ulcers.    Discussed causes of keratomas.  They are due to areas of increase friction.  Hammertoes can create these as they put more pressure to the metatarsal head.  Discussed treatments such as using foot file, pumice stone, metatarsal pads, orthotics, and not walking barefoot.     We discussed the cost structure of callus care if they were to come back and have it treated in the clinic if insurance does not cover it and explained that they would be billed. They were also provided information on places to get the callus treatment.    Currently there is no ulcer at this time.  His insert was modified with felted foam to keep pressure off of this area and again recommended to wear at all times.  Recommended that he use a pumice stone or foot file to this area once to twice a week in the bath or shower to keep the callus from building up.  Was also given an order for urea cream to apply to the feet daily to help soften this area.  He was given an order for compression socks to help with swelling.  All questions were answered to patient satisfaction and he will call further questions or concerns.    Patient risk factor: Patient is at medium risk for infection.        Mckenzie Spaulding DPM, Podiatry/Foot and Ankle Surgery        Again, thank you for allowing me to participate in the care of your patient.        Sincerely,        Mckenzie Spaulding DPM, Podiatry/Foot and Ankle Surgery